# Patient Record
Sex: FEMALE | Race: WHITE | Employment: FULL TIME | URBAN - NONMETROPOLITAN AREA
[De-identification: names, ages, dates, MRNs, and addresses within clinical notes are randomized per-mention and may not be internally consistent; named-entity substitution may affect disease eponyms.]

---

## 2017-08-07 ENCOUNTER — HOSPITAL ENCOUNTER (OUTPATIENT)
Dept: WOMENS IMAGING | Age: 62
Discharge: HOME | End: 2017-08-07
Payer: COMMERCIAL

## 2017-08-07 ENCOUNTER — OFFICE VISIT (OUTPATIENT)
Dept: OBGYN | Age: 62
End: 2017-08-07
Payer: COMMERCIAL

## 2017-08-07 VITALS
SYSTOLIC BLOOD PRESSURE: 138 MMHG | HEART RATE: 74 BPM | DIASTOLIC BLOOD PRESSURE: 80 MMHG | BODY MASS INDEX: 32.18 KG/M2 | TEMPERATURE: 97.8 F | HEIGHT: 67 IN | WEIGHT: 205 LBS

## 2017-08-07 DIAGNOSIS — Z12.4 SCREENING FOR MALIGNANT NEOPLASM OF CERVIX: ICD-10-CM

## 2017-08-07 DIAGNOSIS — Z01.419 ENCOUNTER FOR GYNECOLOGICAL EXAMINATION WITHOUT ABNORMAL FINDING: Primary | ICD-10-CM

## 2017-08-07 DIAGNOSIS — Z12.31 ENCOUNTER FOR SCREENING MAMMOGRAM FOR BREAST CANCER: ICD-10-CM

## 2017-08-07 PROCEDURE — 99396 PREV VISIT EST AGE 40-64: CPT

## 2017-08-07 PROCEDURE — 77063 BREAST TOMOSYNTHESIS BI: CPT

## 2017-08-07 ASSESSMENT — ENCOUNTER SYMPTOMS
SHORTNESS OF BREATH: 0
COUGH: 0
BACK PAIN: 0
TROUBLE SWALLOWING: 0
BLOOD IN STOOL: 0
CONSTIPATION: 0
DIARRHEA: 0
COLOR CHANGE: 0

## 2017-08-09 ENCOUNTER — TELEPHONE (OUTPATIENT)
Dept: WOMENS IMAGING | Age: 62
End: 2017-08-09

## 2017-08-10 ENCOUNTER — HOSPITAL ENCOUNTER (OUTPATIENT)
Dept: WOMENS IMAGING | Age: 62
Discharge: HOME | End: 2017-08-10
Payer: COMMERCIAL

## 2017-08-10 DIAGNOSIS — N63.0 BREAST NODULE: ICD-10-CM

## 2017-08-10 PROCEDURE — 76642 ULTRASOUND BREAST LIMITED: CPT

## 2018-06-08 ENCOUNTER — TRANSCRIBE ORDERS (OUTPATIENT)
Dept: PHYSICAL THERAPY | Facility: HOSPITAL | Age: 63
End: 2018-06-08

## 2018-06-08 DIAGNOSIS — M25.561 RIGHT KNEE PAIN, UNSPECIFIED CHRONICITY: Primary | ICD-10-CM

## 2018-06-11 ENCOUNTER — HOSPITAL ENCOUNTER (OUTPATIENT)
Dept: PHYSICAL THERAPY | Facility: HOSPITAL | Age: 63
Setting detail: THERAPIES SERIES
Discharge: HOME | End: 2018-06-11

## 2018-06-11 DIAGNOSIS — M25.561 RIGHT KNEE PAIN, UNSPECIFIED CHRONICITY: Primary | ICD-10-CM

## 2018-06-11 PROCEDURE — 97161 PT EVAL LOW COMPLEX 20 MIN: CPT | Performed by: PHYSICAL THERAPIST

## 2018-06-11 PROCEDURE — G8979 MOBILITY GOAL STATUS: HCPCS | Performed by: PHYSICAL THERAPIST

## 2018-06-11 PROCEDURE — 97110 THERAPEUTIC EXERCISES: CPT | Performed by: PHYSICAL THERAPIST

## 2018-06-11 PROCEDURE — G8978 MOBILITY CURRENT STATUS: HCPCS | Performed by: PHYSICAL THERAPIST

## 2018-06-11 NOTE — THERAPY EVALUATION
Outpatient Physical Therapy Ortho Initial Evaluation  [facility]     Patient Name: Tabitha Davila  : 1955  MRN: [id number]  Today's Date: 2018      Visit Date: 2018     Subjective Improvement: N/A      Attendance:    Approved:    20 visits/year       MD follow up:   EFFIE         date:   18        There is no problem list on file for this patient.       History reviewed. No pertinent past medical history.     Past Surgical History:   Procedure Laterality Date   •  SECTION     • CHOLECYSTECTOMY     • DILATION AND CURETTAGE, DIAGNOSTIC / THERAPEUTIC     • TONSILLECTOMY       Allergies   Allergen Reactions   • Streptomycin Rash     Caused full body rash with itching as child     Medications:  - Lisinopril  - Aspirin  - Methylprednisolone    Visit Dx:     ICD-10-CM ICD-9-CM   1. Right knee pain, unspecified chronicity M25.561 719.46             Patient History     Row Name 18 1500             History    Chief Complaint Pain  -KG      Type of Pain Knee pain  -KG      Date Current Problem(s) Began 18  -KG      Brief Description of Current Complaint Pt presents with c/o of R knee pain that began on 18. States she felt something popped in the back of her R knee after stepping down a steep step backwards. Had no issues initially after the injury. Sat for a couple of hours that day and it started to swell and become painful with walking. Went to MD at that time and was prescribed Meloxicam; x-rays were negative for fx. Pain started to improve, pt was walking dog in May, reinjured knee again, went to primary care MD and was referred to ortho MD. Was given cortisone/steroid dose pack at [facility] on 18. Pt states most of her pain has been at her medial knee and posterior knee/thigh area, more so medially. Cortisone has helped with pain tremendously. Ambulates with single axillary crutch and has been since May.  -KG      Patient/Caregiver Goals  "Relieve pain;Return to prior level of function;Improve mobility  -KG      Occupation/sports/leisure activities Owns [employer]; does bookeeping and \"everything else\"; lifting, cutting, stooping, bending, etc.  -KG      What clinical tests have you had for this problem? X-ray  -KG      Results of Clinical Tests Negative; no MRI  -KG         Pain     Pain Location Knee  -KG      Pain at Present 0  -KG      Pain at Best 0  -KG      Pain at Worst 1  -KG      Pain Frequency Intermittent  -KG      Pain Description Aching;Sharp  -KG      What Performance Factors Make the Current Problem(s) WORSE? Standing/walking for short periods of time, ascending/descending stairs, squatting  -KG      What Performance Factors Make the Current Problem(s) BETTER? Icing  -KG      Pain Comments Pain much improved since received cortisone/steroid dose pack  -KG      Tolerance Time- Standing Standing for short periods of time  -KG      Tolerance Time- Sitting No issues  -KG      Tolerance Time- Walking Walking for short periods of time  -KG      Tolerance Time- Lying Difficulty finding a comfortable position  -KG      Difficulties at work? Increased pain with work activities.  -KG      Difficulties with ADL's? Independent  -KG      Difficulties with recreational activities? N/A  -KG        User Key  (r) = Recorded By, (t) = Taken By, (c) = Cosigned By    Initials Name Provider Type    KG Zayda Barlow, PT Physical Therapist                PT Ortho     Row Name 06/11/18 1500       Subjective Comments    Subjective Comments Refer to therapy pt history for details.  -KG       Precautions and Contraindications    Precautions/Limitations no known precautions/limitations  -KG       Subjective Pain    Able to rate subjective pain? yes  -KG    Pre-Treatment Pain Level 1  -KG    Post-Treatment Pain Level 0  -KG       Posture/Observations    Posture/Observations Comments No acute distress. Ambulates with single axillary crutch, " non-antalgic gait. Hesitant ambulating without crutch, decreased stance time/heel strike RLE slightly. STR noted at R medial hamstring. Mild edema present medial knee.  -KG       Special Tests/Palpation    Special Tests/Palpation Knee  -KG       Knee Palpation    Pes Anserine Bursa Right:;Tender  -KG    Semimembranosis Right:;Tender;Guarded/taut  -KG    Semitendinosis Right:;Tender;Guarded/taut  -KG    Knee Palpation? Yes  -KG       Patellar Accessory Motions    Patellar Accessory Motions Tested? Yes  -KG    Superior glide WNL  -KG    Inferior glide WNL  -KG    Medial glide WNL  -KG    Lateral glide WNL  -KG       Knee Special Tests    Anterior drawer (ACL lesion) Right:;Negative  -KG    Posterior drawer (PCL lesion) Right:;Negative  -KG    Valgus stress (MCL lesion) Right:;Negative  -KG    Varus stress (LCL lesion) Right:;Negative  -KG    Thessaly test (meniscal lesion) Right:;Negative  -KG    Sasha’s test (meniscal lesion) Right:;Negative   Popping present with ER/iR  -KG    Bounce home test (meniscal lesion) Right:;Negative  -KG       General ROM    RT Lower Ext Rt Knee Extension/Flexion  -KG    LT Lower Ext Lt Knee Extension/Flexion  -KG       Right Lower Ext    Rt Knee Extension/Flexion AROM 0-125  -KG    RT Lower Extremity Comments No pain  -KG       Left Lower Ext    Lt Knee Extension/Flexion AROM 0-126  -KG    LT Lower Extremity Comments No pain  -KG       MMT (Manual Muscle Testing)    Additional Documentation General Assessment (Manual Muscle Testing) (Group)  -KG       General Assessment (Manual Muscle Testing)    General Manual Muscle Testing (MMT) Assessment lower extremity strength deficits identified  -KG       Lower Extremity (Manual Muscle Testing)    Lower Extremity: Manual Muscle Testing (MMT) left hip strength deficit;right hip strength deficit;left knee strength deficit;right knee strength deficit  -KG       Left Hip (Manual Muscle Testing)    MMT, Left Hip: Manual Muscle Testing (MMT)  "Flex/abd grossly 5/5  -KG       Right Hip (Manual Muscle Testing)    MMT, Right Hip: Manual Muscle Testing (MMT) Flex 4+/5, abd 4/5  -KG       Left Knee (Manual Muscle Testing)    Comment, Left Knee: Manual Muscle Testing (MMT) Grossly 5/5  -KG       Right Knee (Manual Muscle Testing)    Comment, Right Knee: Manual Muscle Testing (MMT) Flex 4/5, ext 4/5 with pain lat hamstring; no quad lag noted with independent active SLR  -KG       Sensation    Sensation WNL? WNL  -KG    Light Touch No apparent deficits  -KG       Flexibility    Flexibility Tested? Lower Extremity  -KG       Lower Extremity Flexibility    Hamstrings Right:;Mildly limited  -KG    Quadriceps Right:;Mildly limited  -KG       Balance Skills Training    SLS L SLS 30\", R SLS 3\"  -KG      User Key  (r) = Recorded By, (t) = Taken By, (c) = Cosigned By    Initials Name Provider Type    KG Zayda Barlow, PT Physical Therapist                      Therapy Education  Education Details: POC education, anatomy ed, HEP: iso HS set, HS stretch, QS, SAQ, SLR flex  Given: HEP, Symptoms/condition management, Pain management, Posture/body mechanics, Mobility training, Edema management  Program: New  How Provided: Verbal, Demonstration, Written  Provided to: Patient  Level of Understanding: Teach back education performed, Verbalized, Demonstrated           PT OP Goals     Row Name 06/11/18 1500          PT Short Term Goals    STG Date to Achieve 07/02/18  -KG     STG 1 Independent/compliant with HEP  -KG     STG 1 Progress New  -KG     STG 2 Tolerate 45 minute treatment session without increased pain  -KG     STG 2 Progress New  -KG     STG 3 Ambulate independently with no AD, non-antalgic gait, good heel strike noted  -KG     STG 3 Progress New  -KG     STG 4 Maintain knee AROM WNL without increased pain  -KG     STG 4 Progress New  -KG     STG 5 Perform R SLS on level surface for 30\" or greater  -KG     STG 5 Progress New  -KG        Long Term Goals    LTG Date " to Achieve 07/16/18  -KG     LTG 1 Subjectively report 60% improvement or greater  -KG     LTG 1 Progress New  -KG     LTG 2 Improve LEFS score to 40/80 or greater  -KG     LTG 2 Progress New  -KG     LTG 3 Improve R hip flex/abd MMT to 4+/5 or greater  -KG     LTG 3 Progress New  -KG     LTG 4 Demonstrate R knee flex/ext MMT to 5/5  -KG     LTG 4 Progress New  -KG     LTG 5 Ascend/descend 3 steps x 2, reciprocal pattern, no increased pain/compensation  -KG     LTG 5 Progress New  -KG     LTG 6 Independent in aquatics/fitness & final HEP  -KG     LTG 6 Progress New  -KG     LTG 7 Subjectively report improved TTP at medial hamstring by 75%  -KG     LTG 7 Progress New  -KG        Time Calculation    PT Goal Re-Cert Due Date 07/02/18  -KG       User Key  (r) = Recorded By, (t) = Taken By, (c) = Cosigned By    Initials Name Provider Type    KG Zayda Barlow, PT Physical Therapist                PT Assessment/Plan     Row Name 06/11/18 1500          PT Assessment    Functional Limitations Decreased safety during functional activities;Impaired gait;Limitation in home management;Limitations in community activities;Limitations in functional capacity and performance;Performance in leisure activities;Performance in work activities  -KG     Impairments Balance;Gait;Edema;Impaired flexibility;Impaired muscle endurance;Muscle strength;Pain;Range of motion  -KG     Assessment Comments Pt is a 62 y.o. female presenting with R knee pain. Ligament stability testing negative at this time. Meniscus testing negative as well and non-painful, slight popping noted with Sasha's. Pt very TTP at R medial hamstring with STR present. Improved symptoms reported post manual this date. R knee AROM WFL and non-painful at this time. Pt demonstrates weakness in overall R knee/hip at this time. Received cortisone/steroid dose pack on 6/6/18 which has helped quite a bit with her pain. Pt will benefit from skilled PT services to address these  deficits for improvements in overall knee/hip functional strength, hamstring flexibility/STR, and tolerance to daily activities.  -KG     Please refer to paper survey for additional self-reported information Yes  -KG     Rehab Potential Good  -KG     Patient/caregiver participated in establishment of treatment plan and goals Yes  -KG     Patient would benefit from skilled therapy intervention Yes  -KG        PT Plan    PT Frequency 2x/week  -KG     Predicted Duration of Therapy Intervention (OT Eval) 4-6 weeks  -KG     Planned CPT's? PT EVAL LOW COMPLEXITY: 33068;PT RE-EVAL: 36187;PT THER PROC EA 15 MIN: 13307;PT THER ACT EA 15 MIN: 52123;PT MANUAL THERAPY EA 15 MIN: 88540;PT NEUROMUSC RE-EDUCATION EA 15 MIN: 24971;PT GAIT TRAINING EA 15 MIN: 19761;PT SELF CARE/HOME MGMT/TRAIN EA 15: 92529;PT ELECTRICAL STIM UNATTEND: ;PT AQUATIC THERAPY EA 15 MIN: 04790;PT THER SUPP EA 15 MIN  -KG     Physical Therapy Interventions (Optional Details) aquatics exercise;balance training;home exercise program;manual therapy techniques;modalities;neuromuscular re-education;patient/family education;ROM (Range of Motion);strengthening;stretching  -KG     PT Plan Comments Begin 1x/pool & 1x/land treatment per week and progress to all land therapy. Work on knee/hip strengthening, LE stretching, STM/MFR to medial hamstring, progress CKC activities as tolerated, modalities prn for pain.  -KG       User Key  (r) = Recorded By, (t) = Taken By, (c) = Cosigned By    Initials Name Provider Type    IVON Barlow, PT Physical Therapist                Modalities     Row Name 06/11/18 1500             Ice    Patient denies application of Ice Yes  -KG        User Key  (r) = Recorded By, (t) = Taken By, (c) = Cosigned By    Initials Name Provider Type    IVON Barlow, PT Physical Therapist              Exercises     Row Name 06/11/18 1500             Subjective Comments    Subjective Comments Refer to therapy pt history for details.   "-KG         Subjective Pain    Able to rate subjective pain? yes  -KG      Pre-Treatment Pain Level 1  -KG      Post-Treatment Pain Level 0  -KG         Aquatics    Aquatics performed? No  -KG         Exercise 1    Exercise Name 1 Quad sets  -KG      Sets 1 1  -KG      Reps 1 10  -KG      Time 1 5\" hold  -KG         Exercise 2    Exercise Name 2 SAQ  -KG      Sets 2 1  -KG      Reps 2 10  -KG         Exercise 3    Exercise Name 3 SLR flex  -KG      Sets 3 1  -KG      Reps 3 10  -KG         Exercise 4    Exercise Name 4 Seated hamstring stretch  -KG      Reps 4 2  -KG      Time 4 30\" hold  -KG      Additional Comments Reviewed seated & Long sitting  -KG         Exercise 5    Exercise Name 5 Iso hamstring sets  -KG      Additional Comments Review/demo for HEP  -KG        User Key  (r) = Recorded By, (t) = Taken By, (c) = Cosigned By    Initials Name Provider Type    IVON Barlow PT Physical Therapist           Manual Rx (last 36 hours)      Manual Treatments     Row Name 06/11/18 1500             Manual Rx 1    Manual Rx 1 Location R medial hamstring  -KG      Manual Rx 1 Type STM/MFR  -KG      Manual Rx 1 Duration 5 min  -KG        User Key  (r) = Recorded By, (t) = Taken By, (c) = Cosigned By    Initials Name Provider Type    IVON Barlow, PT Physical Therapist                      Outcome Measure Options: Lower Extremity Functional Scale (LEFS)  Lower Extremity Functional Index  Any of your usual work, housework or school activities: Extreme difficulty or unable to perform activity  Your usual hobbies, recreational or sporting activities: Extreme difficulty or unable to perform activity  Getting into or out of the bath: Quite a bit of difficulty  Walking between rooms: Quite a bit of difficulty  Putting on your shoes or socks: No difficulty  Squatting: No difficulty  Lifting an object, like a bag of groceries from the floor: No difficulty  Performing light activities around your home: Moderate " difficulty  Performing heavy activities around your home: Moderate difficulty  Getting into or out of a car: Moderate difficulty  Walking 2 blocks: Extreme difficulty or unable to perform activity  Walking a mile: Extreme difficulty or unable to perform activity  Going up or down 10 stairs (about 1 flight of stairs): Extreme difficulty or unable to perform activity  Standing for 1 hour: Extreme difficulty or unable to perform activity  Sitting for 1 hour: No difficulty  Running on even ground: Extreme difficulty or unable to perform activity  Running on uneven ground: Extreme difficulty or unable to perform activity  Making sharp turns while running fast: Extreme difficulty or unable to perform activity  Hopping: Extreme difficulty or unable to perform activity  Rolling over in bed: A little bit of difficulty  Total: 27      Time Calculation:   Start Time: 1522  Stop Time: 1608  Time Calculation (min): 46 min  Total Timed Code Minutes- PT: 20 minute(s)     Therapy Charges for Today     Code Description Service Date Service Provider Modifiers Qty    55549092499 HC PT MOBILITY CURRENT 6/11/2018 Zayda Barlow, PT GP, CK 1    16841154910 HC PT MOBILITY PROJECTED 6/11/2018 Zayda Barlow, PT GP, CI 1    29322081556 HC PT THER PROC EA 15 MIN 6/11/2018 Zayda Barlow, PT GP 1    24638115724 HC PT EVAL LOW COMPLEXITY 2 6/11/2018 Zayda Barlow, PT GP 1          PT G-Codes  PT Professional Judgement Used?: Yes  Outcome Measure Options: Lower Extremity Functional Scale (LEFS)  Score: 27/80  Functional Limitation: Mobility: Walking and moving around  Mobility: Walking and Moving Around Current Status (): At least 40 percent but less than 60 percent impaired, limited or restricted  Mobility: Walking and Moving Around Goal Status (): At least 1 percent but less than 20 percent impaired, limited or restricted         Zayda Barlow, PT  6/11/2018

## 2018-06-13 ENCOUNTER — HOSPITAL ENCOUNTER (OUTPATIENT)
Dept: PHYSICAL THERAPY | Facility: HOSPITAL | Age: 63
Setting detail: THERAPIES SERIES
Discharge: HOME | End: 2018-06-13

## 2018-06-13 DIAGNOSIS — M25.561 RIGHT KNEE PAIN, UNSPECIFIED CHRONICITY: Primary | ICD-10-CM

## 2018-06-13 PROCEDURE — 97110 THERAPEUTIC EXERCISES: CPT

## 2018-06-13 NOTE — THERAPY TREATMENT NOTE
Outpatient Physical Therapy Ortho Treatment Note  [facility]     Patient Name: Tabitha Davila  : 1955  MRN: [id number]  Today's Date: 2018      Subjective Improvement: N/A      Attendance:   Approved:    20 visits/year       MD follow up:   EFFIE MCFARLAND date:   18      Visit Date: 2018    Visit Dx:    ICD-10-CM ICD-9-CM   1. Right knee pain, unspecified chronicity M25.561 719.46       There is no problem list on file for this patient.       No past medical history on file.     Past Surgical History:   Procedure Laterality Date   •  SECTION     • CHOLECYSTECTOMY     • DILATION AND CURETTAGE, DIAGNOSTIC / THERAPEUTIC     • TONSILLECTOMY               PT Ortho     Row Name 18 1600       Subjective Pain    Able to rate subjective pain? yes  -NH    Pre-Treatment Pain Level 2  -NH    Row Name 18 1500       Subjective Comments    Subjective Comments Refer to therapy pt history for details.  -KG       Precautions and Contraindications    Precautions/Limitations no known precautions/limitations  -KG       Subjective Pain    Able to rate subjective pain? yes  -KG    Pre-Treatment Pain Level 1  -KG    Post-Treatment Pain Level 0  -KG       Posture/Observations    Posture/Observations Comments No acute distress. Ambulates with single axillary crutch, non-antalgic gait. Hesitant ambulating without crutch, decreased stance time/heel strike RLE slightly. STR noted at R medial hamstring. Mild edema present medial knee.  -KG       Special Tests/Palpation    Special Tests/Palpation Knee  -KG       Knee Palpation    Pes Anserine Bursa Right:;Tender  -KG    Semimembranosis Right:;Tender;Guarded/taut  -KG    Semitendinosis Right:;Tender;Guarded/taut  -KG    Knee Palpation? Yes  -KG       Patellar Accessory Motions    Patellar Accessory Motions Tested? Yes  -KG    Superior glide WNL  -KG    Inferior glide WNL  -KG    Medial glide WNL  -KG    Lateral glide WNL  -KG        Knee Special Tests    Anterior drawer (ACL lesion) Right:;Negative  -KG    Posterior drawer (PCL lesion) Right:;Negative  -KG    Valgus stress (MCL lesion) Right:;Negative  -KG    Varus stress (LCL lesion) Right:;Negative  -KG    Thessaly test (meniscal lesion) Right:;Negative  -KG    Sasha’s test (meniscal lesion) Right:;Negative   Popping present with ER/iR  -KG    Bounce home test (meniscal lesion) Right:;Negative  -KG       General ROM    RT Lower Ext Rt Knee Extension/Flexion  -KG    LT Lower Ext Lt Knee Extension/Flexion  -KG       Right Lower Ext    Rt Knee Extension/Flexion AROM 0-125  -KG    RT Lower Extremity Comments No pain  -KG       Left Lower Ext    Lt Knee Extension/Flexion AROM 0-126  -KG    LT Lower Extremity Comments No pain  -KG       MMT (Manual Muscle Testing)    Additional Documentation General Assessment (Manual Muscle Testing) (Group)  -KG       General Assessment (Manual Muscle Testing)    General Manual Muscle Testing (MMT) Assessment lower extremity strength deficits identified  -KG       Lower Extremity (Manual Muscle Testing)    Lower Extremity: Manual Muscle Testing (MMT) left hip strength deficit;right hip strength deficit;left knee strength deficit;right knee strength deficit  -KG       Left Hip (Manual Muscle Testing)    MMT, Left Hip: Manual Muscle Testing (MMT) Flex/abd grossly 5/5  -KG       Right Hip (Manual Muscle Testing)    MMT, Right Hip: Manual Muscle Testing (MMT) Flex 4+/5, abd 4/5  -KG       Left Knee (Manual Muscle Testing)    Comment, Left Knee: Manual Muscle Testing (MMT) Grossly 5/5  -KG       Right Knee (Manual Muscle Testing)    Comment, Right Knee: Manual Muscle Testing (MMT) Flex 4/5, ext 4/5 with pain lat hamstring; no quad lag noted with independent active SLR  -KG       Sensation    Sensation WNL? WNL  -KG    Light Touch No apparent deficits  -KG       Flexibility    Flexibility Tested? Lower Extremity  -KG       Lower Extremity Flexibility    Hamstrings  "Right:;Mildly limited  -KG    Quadriceps Right:;Mildly limited  -KG       Balance Skills Training    SLS L SLS 30\", R SLS 3\"  -KG      User Key  (r) = Recorded By, (t) = Taken By, (c) = Cosigned By    Initials Name Provider Type     Zayda Barlow, PT Physical Therapist    NH Harriet Renteria, PT Physical Therapist                            PT Assessment/Plan     Row Name 06/13/18 1600          PT Assessment    Functional Limitations Decreased safety during functional activities;Impaired gait;Limitation in home management;Limitations in community activities;Limitations in functional capacity and performance;Performance in leisure activities;Performance in work activities  -NH     Impairments Balance;Gait;Edema;Impaired flexibility;Impaired muscle endurance;Muscle strength;Pain;Range of motion  -NH     Assessment Comments Improved STR at medial HS insertion. Patient able to advance exercises with minimal difficulty. Patient requires cues with amb with SPC to improve heel strike. Patient amb with R foot externally rotated but able to fix given periodic cuing.   -NH     Rehab Potential Good  -NH     Patient/caregiver participated in establishment of treatment plan and goals Yes  -NH     Patient would benefit from skilled therapy intervention Yes  -NH        PT Plan    PT Frequency 2x/week  -NH     Predicted Duration of Therapy Intervention (Therapy Eval) 4-6 weeks  -NH     PT Plan Comments 1x pool/1x land next week. F/U results of advancements in ther ex and KT tape.   -NH       User Key  (r) = Recorded By, (t) = Taken By, (c) = Cosigned By    Initials Name Provider Type    NH Harriet Renteria, PT Physical Therapist                Modalities     Row Name 06/13/18 1600             Ice    Patient denies application of Ice Yes  -NH        User Key  (r) = Recorded By, (t) = Taken By, (c) = Cosigned By    Initials Name Provider Type    NH Harriet Renteria, PT Physical Therapist                Exercises     Row Name " 06/13/18 1600             Subjective Pain    Able to rate subjective pain? yes  -NH      Pre-Treatment Pain Level 2  -NH         Exercise 1    Exercise Name 1 Pro II for ROM/endurance  -NH      Time 1 8 min  -NH      Additional Comments L2  -NH         Exercise 2    Exercise Name 2 SAQ  -NH      Sets 2 2  -NH      Reps 2 10  -NH         Exercise 3    Exercise Name 3 SLR flex  -NH      Sets 3 2  -NH      Reps 3 10  -NH         Exercise 4    Exercise Name 4 Quad set  -NH      Sets 4 2  -NH      Reps 4 10  -NH         Exercise 5    Exercise Name 5 LAQ with hip add  -NH      Sets 5 2  -NH      Reps 5 10  -NH         Exercise 6    Exercise Name 6 HS curls seated  -NH      Sets 6 2  -NH      Reps 6 10  -NH      Additional Comments Red tband  -NH         Exercise 7    Exercise Name 7 TKE  -NH      Sets 7 2  -NH      Reps 7 10  -NH      Additional Comments red tband  -NH         Exercise 8    Exercise Name 8 Seated HS stretch  -NH      Sets 8 3  -NH      Time 8 30 sec  -NH         Exercise 9    Exercise Name 9 Slantboard Stretch  -NH      Sets 9 3  -NH      Time 9 30 sec  -NH        User Key  (r) = Recorded By, (t) = Taken By, (c) = Cosigned By    Initials Name Provider Type    NH Harriet Renteria, PT Physical Therapist                        Manual Rx (last 36 hours)      Manual Treatments     Row Name 06/13/18 1500             Manual Rx 1    Manual Rx 1 Location R Knee   -NH      Manual Rx 1 Type KT tape with patellar band and 2 piece cross  -NH      Manual Rx 1 Duration 3 min  -NH        User Key  (r) = Recorded By, (t) = Taken By, (c) = Cosigned By    Initials Name Provider Type    NH Harriet Renteria, PT Physical Therapist                PT OP Goals     Row Name 06/13/18 1600          PT Short Term Goals    STG Date to Achieve 07/02/18  -NH     STG 1 Independent/compliant with HEP  -NH     STG 1 Progress New  -NH     STG 2 Tolerate 45 minute treatment session without increased pain  -NH     STG 2 Progress New  -NH      "STG 3 Ambulate independently with no AD, non-antalgic gait, good heel strike noted  -NH     STG 3 Progress New  -NH     STG 4 Maintain knee AROM WNL without increased pain  -NH     STG 4 Progress New  -NH     STG 5 Perform R SLS on level surface for 30\" or greater  -NH     STG 5 Progress New  -NH        Long Term Goals    LTG Date to Achieve 07/16/18  -NH     LTG 1 Subjectively report 60% improvement or greater  -NH     LTG 1 Progress New  -NH     LTG 2 Improve LEFS score to 40/80 or greater  -NH     LTG 2 Progress New  -NH     LTG 3 Improve R hip flex/abd MMT to 4+/5 or greater  -NH     LTG 3 Progress New  -NH     LTG 4 Demonstrate R knee flex/ext MMT to 5/5  -NH     LTG 4 Progress New  -NH     LTG 5 Ascend/descend 3 steps x 2, reciprocal pattern, no increased pain/compensation  -NH     LTG 5 Progress New  -NH     LTG 6 Independent in aquatics/fitness & final HEP  -NH     LTG 6 Progress New  -NH     LTG 7 Subjectively report improved TTP at medial hamstring by 75%  -NH     LTG 7 Progress New  -NH        Time Calculation    PT Goal Re-Cert Due Date 07/02/18  -NH       User Key  (r) = Recorded By, (t) = Taken By, (c) = Cosigned By    Initials Name Provider Type    NH Harriet Renteria, PT Physical Therapist                         Time Calculation:   Start Time: 1605  Stop Time: 1651  Time Calculation (min): 46 min  Total Timed Code Minutes- PT: 46 minute(s)  Therapy Suggested Charges     Code   Minutes Charges    None           Therapy Charges for Today     Code Description Service Date Service Provider Modifiers Qty    37079888552  PT THER PROC EA 15 MIN 6/13/2018 Harriet Renteria, PT GP 3                    Harriet Renteria, PT  6/13/2018     "

## 2018-06-19 ENCOUNTER — HOSPITAL ENCOUNTER (OUTPATIENT)
Dept: PHYSICAL THERAPY | Facility: HOSPITAL | Age: 63
Setting detail: THERAPIES SERIES
Discharge: HOME | End: 2018-06-19

## 2018-06-19 DIAGNOSIS — M25.561 RIGHT KNEE PAIN, UNSPECIFIED CHRONICITY: Primary | ICD-10-CM

## 2018-06-19 PROCEDURE — 97113 AQUATIC THERAPY/EXERCISES: CPT | Performed by: PHYSICAL THERAPIST

## 2018-06-19 NOTE — THERAPY TREATMENT NOTE
Outpatient Physical Therapy Ortho Treatment Note  [facility]     Patient Name: Tabitha Davila  : 1955  MRN: [id number]  Today's Date: 2018      Visit Date: 2018     Subjective Improvement: 70%       Attendance: 3/3  Approved:   20 visits/year        MD follow up:   EFFIE MCFARLAND date:   18        Visit Dx:    ICD-10-CM ICD-9-CM   1. Right knee pain, unspecified chronicity M25.561 719.46       There is no problem list on file for this patient.       No past medical history on file.     Past Surgical History:   Procedure Laterality Date   •  SECTION     • CHOLECYSTECTOMY     • DILATION AND CURETTAGE, DIAGNOSTIC / THERAPEUTIC     • TONSILLECTOMY               PT Ortho     Row Name 18 1500       Precautions and Contraindications    Precautions/Limitations no known precautions/limitations  -KG       Subjective Pain    Able to rate subjective pain? yes  -KG    Pre-Treatment Pain Level 1  -KG    Post-Treatment Pain Level 0  -KG       Posture/Observations    Posture/Observations Comments No acute distress. Ambulates with SPC, non-antalgic gait. Ambulated with no AD after getting out of pool, non-antalgic gait.  -KG      User Key  (r) = Recorded By, (t) = Taken By, (c) = Cosigned By    Initials Name Provider Type    KG Zayda Barlow, PT Physical Therapist                            PT Assessment/Plan     Row Name 18 1500          PT Assessment    Functional Limitations Decreased safety during functional activities;Impaired gait;Limitation in home management;Limitations in community activities;Limitations in functional capacity and performance;Performance in leisure activities;Performance in work activities  -KG     Impairments Balance;Gait;Edema;Impaired flexibility;Impaired muscle endurance;Muscle strength;Pain;Range of motion  -KG     Assessment Comments Pt with good initial tolerance to aquatic treatment. Ocassional cuing need for proper mini squat/lunge  "performance. No increased pain reported throughout treatment. Increased subjective improvement reported this date. Pt reports she may want to continue with all land treatments as her pain has improved.  -KG     Rehab Potential Good  -KG     Patient/caregiver participated in establishment of treatment plan and goals Yes  -KG     Patient would benefit from skilled therapy intervention Yes  -KG        PT Plan    PT Frequency 2x/week  -KG     Predicted Duration of Therapy Intervention (Therapy Eval) 4-6 weeks  -KG     PT Plan Comments Will continue all land treatments at this time. Continue to progress CKC/strengthening activities as tolerated.  -KG       User Key  (r) = Recorded By, (t) = Taken By, (c) = Cosigned By    Initials Name Provider Type    KG Zayda Barlow, PT Physical Therapist                    Exercises     Row Name 06/19/18 1500             Subjective Comments    Subjective Comments Pt states her knee is feeling much better. Only has twinge every now & then.  -KG         Subjective Pain    Able to rate subjective pain? yes  -KG      Pre-Treatment Pain Level 1  -KG      Post-Treatment Pain Level 0  -KG         Aquatics    Aquatics performed? Yes  -KG         Aquatics LE    Water Walk forward;side;backward;Other (comment)   F/B 4', lateral 3', march walk 3'  -KG      Stretch 1 Hamstring stretch 3x30\"  -KG      Hip Abd/Add 2x10; bilateral  -KG      Hip Flex/Ext 2x10; bilateral  -KG      Mini Squat 2x10  -KG      Toe/Heel Raises 2x10  -KG      Bicycle 3 min deep water  -KG      Flutter/Scissor 2 min deep water each direction  -KG         Exercise 1    Exercise Name 1 AQ: Float step march with BRF  -KG      Sets 1 2  -KG      Reps 1 10  -KG         Exercise 2    Exercise Name 2 AQ: Hamstring curl  -KG      Sets 2 2  -KG      Reps 2 10  -KG         Exercise 3    Exercise Name 3 AQ: Static lunges  -KG      Cueing 3 Verbal  -KG      Sets 3 2  -KG      Reps 3 10  -KG      Additional Comments Cues for form  -KG " "       User Key  (r) = Recorded By, (t) = Taken By, (c) = Cosigned By    Initials Name Provider Type    KG Zayda Barlow, PT Physical Therapist                               PT OP Goals     Row Name 06/19/18 1500          PT Short Term Goals    STG Date to Achieve 07/02/18  -KG     STG 1 Independent/compliant with HEP  -KG     STG 1 Progress Progressing  -KG     STG 2 Tolerate 45 minute treatment session without increased pain  -KG     STG 2 Progress Progressing  -KG     STG 3 Ambulate independently with no AD, non-antalgic gait, good heel strike noted  -KG     STG 3 Progress Progressing  -KG     STG 4 Maintain knee AROM WNL without increased pain  -KG     STG 4 Progress Progressing  -KG     STG 5 Perform R SLS on level surface for 30\" or greater  -KG     STG 5 Progress Progressing  -KG        Long Term Goals    LTG Date to Achieve 07/16/18  -KG     LTG 1 Subjectively report 60% improvement or greater  -KG     LTG 1 Progress Progressing  -KG     LTG 2 Improve LEFS score to 40/80 or greater  -KG     LTG 2 Progress Progressing  -KG     LTG 3 Improve R hip flex/abd MMT to 4+/5 or greater  -KG     LTG 3 Progress Progressing  -KG     LTG 4 Demonstrate R knee flex/ext MMT to 5/5  -KG     LTG 4 Progress Progressing  -KG     LTG 5 Ascend/descend 3 steps x 2, reciprocal pattern, no increased pain/compensation  -KG     LTG 5 Progress Progressing  -KG     LTG 6 Independent in aquatics/fitness & final HEP  -KG     LTG 6 Progress Progressing  -KG     LTG 7 Subjectively report improved TTP at medial hamstring by 75%  -KG     LTG 7 Progress Progressing  -KG        Time Calculation    PT Goal Re-Cert Due Date 07/02/18  -KG       User Key  (r) = Recorded By, (t) = Taken By, (c) = Cosigned By    Initials Name Provider Type    IVON Barlow, PT Physical Therapist          Therapy Education  Given: HEP, Symptoms/condition management, Posture/body mechanics  Program: Reinforced  How Provided: Verbal  Provided to: Patient  Level " of Understanding: Verbalized, Demonstrated              Time Calculation:   Start Time: 1522  Stop Time: 1605  Time Calculation (min): 43 min  Total Timed Code Minutes- PT: 43 minute(s)  Therapy Suggested Charges     Code   Minutes Charges    None           Therapy Charges for Today     Code Description Service Date Service Provider Modifiers Qty    [id number] HC PT AQUATIC THERAPY EA 15 MIN 6/19/2018 Zayda Barlow, PT GP 3                    Zayda Barlow, PT  6/19/2018

## 2018-06-21 ENCOUNTER — HOSPITAL ENCOUNTER (OUTPATIENT)
Dept: PHYSICAL THERAPY | Facility: HOSPITAL | Age: 63
Setting detail: THERAPIES SERIES
Discharge: HOME | End: 2018-06-21

## 2018-06-21 DIAGNOSIS — M25.561 RIGHT KNEE PAIN, UNSPECIFIED CHRONICITY: Primary | ICD-10-CM

## 2018-06-21 PROCEDURE — 97110 THERAPEUTIC EXERCISES: CPT | Performed by: PHYSICAL THERAPIST

## 2018-06-21 NOTE — THERAPY TREATMENT NOTE
Outpatient Physical Therapy Ortho Treatment Note  [facility]     Patient Name: Tabitha Davila  : 1955  MRN: [id number]  Today's Date: 2018      Visit Date: 2018     Subjective Improvement:   70%    Attendance:   Approved:           MD follow up:    EFFIE MCFARLAND date:    18       Visit Dx:    ICD-10-CM ICD-9-CM   1. Right knee pain, unspecified chronicity M25.561 719.46       There is no problem list on file for this patient.       No past medical history on file.     Past Surgical History:   Procedure Laterality Date   •  SECTION     • CHOLECYSTECTOMY     • DILATION AND CURETTAGE, DIAGNOSTIC / THERAPEUTIC     • TONSILLECTOMY               PT Ortho     Row Name 18 1500       Precautions and Contraindications    Precautions/Limitations no known precautions/limitations  -KG       Posture/Observations    Posture/Observations Comments No acute distress. Ambulates with no AD, non-antalgic this date.  -KG    Row Name 18 1500       Precautions and Contraindications    Precautions/Limitations no known precautions/limitations  -KG       Subjective Pain    Able to rate subjective pain? yes  -KG    Pre-Treatment Pain Level 1  -KG    Post-Treatment Pain Level 0  -KG       Posture/Observations    Posture/Observations Comments No acute distress. Ambulates with SPC, non-antalgic gait. Ambulated with no AD after getting out of pool, non-antalgic gait.  -KG      User Key  (r) = Recorded By, (t) = Taken By, (c) = Cosigned By    Initials Name Provider Type    IVON Barlow PT Physical Therapist                            PT Assessment/Plan     Row Name 18 1500          PT Assessment    Functional Limitations Decreased safety during functional activities;Impaired gait;Limitation in home management;Limitations in community activities;Limitations in functional capacity and performance;Performance in leisure activities;Performance in work activities   -KG     Impairments Balance;Gait;Edema;Impaired flexibility;Impaired muscle endurance;Muscle strength;Pain;Range of motion  -KG     Assessment Comments Pt tolerated treatment well this date without increased pain. Reports fatigue/soreness with SL abd. Demonstrates improved STR at medial hamstring insertion this date.   -KG     Rehab Potential Good  -KG     Patient/caregiver participated in establishment of treatment plan and goals Yes  -KG     Patient would benefit from skilled therapy intervention Yes  -KG        PT Plan    PT Frequency 2x/week  -KG     Predicted Duration of Therapy Intervention (Therapy Eval) 4-6 weeks  -KG     PT Plan Comments Aquatic treatment next visit.   -KG       User Key  (r) = Recorded By, (t) = Taken By, (c) = Cosigned By    Initials Name Provider Type    KG Zayda Barlow, PT Physical Therapist                Modalities     Row Name 06/21/18 1500             Subjective Comments    Subjective Comments Pt states she was a little sore in her knee and at the back of her knee after the last pool treatment. Feels better today.  -KG         Subjective Pain    Able to rate subjective pain? yes  -KG      Pre-Treatment Pain Level 1  -KG         Ice    Patient denies application of Ice Yes  -KG        User Key  (r) = Recorded By, (t) = Taken By, (c) = Cosigned By    Initials Name Provider Type    IVON Barlow, PT Physical Therapist                Exercises     Row Name 06/21/18 1500             Subjective Comments    Subjective Comments Pt states she was a little sore in her knee and at the back of her knee after the last pool treatment. Feels better today.  -KG         Subjective Pain    Able to rate subjective pain? yes  -KG      Pre-Treatment Pain Level 1  -KG      Post-Treatment Pain Level 1  -KG         Aquatics    Aquatics performed? No  -KG         Exercise 1    Exercise Name 1 Pro II for ROM/endurance  -KG      Time 1 10 min  -KG      Additional Comments L 3.0  -KG         Exercise  "2    Exercise Name 2 Incline stretch  -KG      Reps 2 3  -KG      Time 2 30\" hold  -KG         Exercise 3    Exercise Name 3 Standing HS stretch  -KG      Reps 3 2  -KG      Time 3 30\" hold  -KG         Exercise 4    Exercise Name 4 Standing CR/TR  -KG      Sets 4 1  -KG      Reps 4 20  -KG         Exercise 5    Exercise Name 5 Mini squats  -KG      Cueing 5 Verbal  -KG      Sets 5 2  -KG      Reps 5 10  -KG         Exercise 6    Exercise Name 6 TKE  -KG      Cueing 6 Verbal  -KG      Sets 6 2  -KG      Reps 6 10  -KG      Additional Comments Red tband  -KG         Exercise 7    Exercise Name 7 LAQ with hip add  -KG      Sets 7 2  -KG      Reps 7 10  -KG         Exercise 8    Exercise Name 8 HS curls seated  -KG      Sets 8 2  -KG      Reps 8 10  -KG         Exercise 9    Exercise Name 9 SLR flex  -KG      Sets 9 2  -KG      Reps 9 10  -KG         Exercise 10    Exercise Name 10 SAQ  -KG      Sets 10 2  -KG      Reps 10 10  -KG      Time 10 3\" hold + 3\" pause longterm down  -KG         Exercise 11    Exercise Name 11 SL hip abd  -KG      Sets 11 2  -KG      Reps 11 10  -KG        User Key  (r) = Recorded By, (t) = Taken By, (c) = Cosigned By    Initials Name Provider Type    KG Zayda Barlow, PT Physical Therapist                               PT OP Goals     Row Name 06/21/18 1500          PT Short Term Goals    STG Date to Achieve 07/02/18  -KG     STG 1 Independent/compliant with HEP  -KG     STG 1 Progress Met;Ongoing  -KG     STG 2 Tolerate 45 minute treatment session without increased pain  -KG     STG 2 Progress Met  -KG     STG 3 Ambulate independently with no AD, non-antalgic gait, good heel strike noted  -KG     STG 3 Progress Progressing  -KG     STG 4 Maintain knee AROM WNL without increased pain  -KG     STG 4 Progress Partially Met  -KG     STG 5 Perform R SLS on level surface for 30\" or greater  -KG     STG 5 Progress Progressing  -KG        Long Term Goals    LTG Date to Achieve 07/16/18  -KG     " LTG 1 Subjectively report 60% improvement or greater  -KG     LTG 1 Progress Progressing  -KG     LTG 2 Improve LEFS score to 40/80 or greater  -KG     LTG 2 Progress Progressing  -KG     LTG 3 Improve R hip flex/abd MMT to 4+/5 or greater  -KG     LTG 3 Progress Progressing  -KG     LTG 4 Demonstrate R knee flex/ext MMT to 5/5  -KG     LTG 4 Progress Progressing  -KG     LTG 5 Ascend/descend 3 steps x 2, reciprocal pattern, no increased pain/compensation  -KG     LTG 5 Progress Progressing  -KG     LTG 6 Independent in aquatics/fitness & final HEP  -KG     LTG 6 Progress Progressing  -KG     LTG 7 Subjectively report improved TTP at medial hamstring by 75%  -KG     LTG 7 Progress Progressing  -KG        Time Calculation    PT Goal Re-Cert Due Date 07/02/18  -KG       User Key  (r) = Recorded By, (t) = Taken By, (c) = Cosigned By    Initials Name Provider Type    KG Zayda Barlow, PT Physical Therapist          Therapy Education  Given: HEP, Symptoms/condition management, Posture/body mechanics  Program: Reinforced  How Provided: Verbal  Provided to: Patient  Level of Understanding: Verbalized, Demonstrated              Time Calculation:   Start Time: 1517  Stop Time: 1602  Time Calculation (min): 45 min  Total Timed Code Minutes- PT: 45 minute(s)  Therapy Suggested Charges     Code   Minutes Charges    None           Therapy Charges for Today     Code Description Service Date Service Provider Modifiers Qty    00242209485 HC PT THER PROC EA 15 MIN 6/21/2018 Zayda Barlow, PT GP 3                    Zayda Barlow, PT  6/21/2018

## 2018-06-26 ENCOUNTER — HOSPITAL ENCOUNTER (OUTPATIENT)
Dept: PHYSICAL THERAPY | Facility: HOSPITAL | Age: 63
Setting detail: THERAPIES SERIES
Discharge: HOME | End: 2018-06-26

## 2018-06-26 DIAGNOSIS — M25.561 RIGHT KNEE PAIN, UNSPECIFIED CHRONICITY: Primary | ICD-10-CM

## 2018-06-26 NOTE — THERAPY TREATMENT NOTE
Outpatient Physical Therapy Ortho Treatment Note  [facility]     Patient Name: Tabitha Davila  : 1955  MRN: [id number]  Today's Date: 2018      Visit Date: 2018  Subjective Improvement:   70%    Attendance:   Approved:           MD follow up:    EFFIE MCFARLAND date:    18   Visit Dx:    ICD-10-CM ICD-9-CM   1. Right knee pain, unspecified chronicity M25.561 719.46       There is no problem list on file for this patient.       No past medical history on file.     Past Surgical History:   Procedure Laterality Date   •  SECTION     • CHOLECYSTECTOMY     • DILATION AND CURETTAGE, DIAGNOSTIC / THERAPEUTIC     • TONSILLECTOMY               PT Ortho     Row Name 18 1500       Subjective Comments    Subjective Comments Pt states she is clicking a lot with excs. Sore; a lot of walking over weekend with class reunion.  -PM       Precautions and Contraindications    Precautions/Limitations no known precautions/limitations  -PM       Subjective Pain    Able to rate subjective pain? yes  -PM    Pre-Treatment Pain Level 1  -PM    Post-Treatment Pain Level 1  -PM       Posture/Observations    Posture/Observations Comments No acute distress. Ambulates with no AD, non-antalgic this date.  -PM      User Key  (r) = Recorded By, (t) = Taken By, (c) = Cosigned By    Initials Name Provider Type    PM Sarika Mcginnis PTA Physical Therapy Assistant                            PT Assessment/Plan     Row Name 18 1500          PT Assessment    Assessment Comments Pt did well with all Rx this date. Noted swelling and tenderness pes ans bursa as well as ham, medially.  -PM     Rehab Potential Good  -PM     Patient/caregiver participated in establishment of treatment plan and goals Yes  -PM     Patient would benefit from skilled therapy intervention Yes  -PM        PT Plan    PT Frequency 2x/week  -PM     Predicted Duration of Therapy Intervention (Therapy Eval) 4-6 wks   -PM     PT Plan Comments Aquatic NV  -PM       User Key  (r) = Recorded By, (t) = Taken By, (c) = Cosigned By    Initials Name Provider Type    PM Sarika Mcginnis PTA Physical Therapy Assistant                Modalities     Row Name 06/26/18 1500             Ice    Patient reports will apply ice at home to involved area Yes   ice to go  -PM        User Key  (r) = Recorded By, (t) = Taken By, (c) = Cosigned By    Initials Name Provider Type    PM Sarika Mcginnis PTA Physical Therapy Assistant                Exercises     Row Name 06/26/18 1500             Subjective Comments    Subjective Comments Pt states she is clicking a lot with excs. Sore; a lot of walking over weekend with class reunion.  -PM         Subjective Pain    Able to rate subjective pain? yes  -PM      Pre-Treatment Pain Level 1  -PM      Post-Treatment Pain Level 1  -PM         Aquatics    Aquatics performed? No  -PM         Exercise 1    Exercise Name 1 Pro II for ROM/endurance  -PM      Time 1 10 min  -PM      Additional Comments L3.4  -PM         Exercise 2    Exercise Name 2 SL hip abd  -PM      Cueing 2 Verbal  -PM      Sets 2 2  -PM      Reps 2 10  -PM         Exercise 3    Exercise Name 3 SLR flex  -PM      Sets 3 2  -PM      Reps 3 10  -PM         Exercise 4    Exercise Name 4 Quad set  -PM      Sets 4 2  -PM      Reps 4 10  -PM         Exercise 5    Exercise Name 5 LAQ with hip add  -PM      Sets 5 2  -PM      Reps 5 10  -PM         Exercise 6    Exercise Name 6 HS curls seated  -PM      Sets 6 2  -PM      Reps 6 10  -PM      Additional Comments Red TB  -PM         Exercise 7    Exercise Name 7 TKE  -PM      Sets 7 2  -PM      Reps 7 10  -PM      Additional Comments red TB  -PM         Exercise 8    Exercise Name 8 Seated HS stretch  -PM      Sets 8 3  -PM      Time 8 30 sec  -PM         Exercise 9    Exercise Name 9 Slantboard Stretch  -PM      Sets 9 2  -PM      Time 9 30 sec  -PM         Exercise 10    Exercise Name 10 std hip  "abd B  -PM      Cueing 10 Verbal  -PM      Sets 10 2  -PM      Reps 10 10  -PM         Exercise 11    Exercise Name 11 std hip ext B  -PM      Cueing 11 Verbal  -PM      Sets 11 2  -PM      Reps 11 10  -PM         Exercise 12    Exercise Name 12 AirEx CR/TR, MS  -PM      Cueing 12 Verbal  -PM      Reps 12 2  -PM      Time 12 10  -PM        User Key  (r) = Recorded By, (t) = Taken By, (c) = Cosigned By    Initials Name Provider Type    PM Sarika Mcginnis PTA Physical Therapy Assistant                        Manual Rx (last 36 hours)      Manual Treatments     Row Name 06/26/18 1500             Manual Rx 1    Manual Rx 1 Location R medial hamstring and pes ans  -PM      Manual Rx 1 Type STM/MFR  -PM      Manual Rx 1 Duration 6 min  -PM        User Key  (r) = Recorded By, (t) = Taken By, (c) = Cosigned By    Initials Name Provider Type    PM Sarika Mcginnis PTA Physical Therapy Assistant                PT OP Goals     Row Name 06/26/18 1500          PT Short Term Goals    STG Date to Achieve 07/02/18  -PM     STG 1 Independent/compliant with HEP  -PM     STG 1 Progress Met;Ongoing  -PM     STG 2 Tolerate 45 minute treatment session without increased pain  -PM     STG 2 Progress Met  -PM     STG 3 Ambulate independently with no AD, non-antalgic gait, good heel strike noted  -PM     STG 3 Progress Progressing  -PM     STG 4 Maintain knee AROM WNL without increased pain  -PM     STG 4 Progress Partially Met  -PM     STG 5 Perform R SLS on level surface for 30\" or greater  -PM     STG 5 Progress Progressing  -PM        Long Term Goals    LTG Date to Achieve 07/16/18  -PM     LTG 1 Subjectively report 60% improvement or greater  -PM     LTG 1 Progress Progressing  -PM     LTG 2 Improve LEFS score to 40/80 or greater  -PM     LTG 2 Progress Progressing  -PM     LTG 3 Improve R hip flex/abd MMT to 4+/5 or greater  -PM     LTG 3 Progress Progressing  -PM     LTG 4 Demonstrate R knee flex/ext MMT to 5/5  -PM     LTG 4 " Progress Progressing  -PM     LTG 5 Ascend/descend 3 steps x 2, reciprocal pattern, no increased pain/compensation  -PM     LTG 5 Progress Progressing  -PM     LTG 6 Independent in aquatics/fitness & final HEP  -PM     LTG 6 Progress Progressing  -PM     LTG 7 Subjectively report improved TTP at medial hamstring by 75%  -PM     LTG 7 Progress Progressing  -PM        Time Calculation    PT Goal Re-Cert Due Date 07/02/18  -PM       User Key  (r) = Recorded By, (t) = Taken By, (c) = Cosigned By    Initials Name Provider Type    PM Sarika Mcginnis PTA Physical Therapy Assistant          Therapy Education  Given: HEP, Symptoms/condition management, Posture/body mechanics  Program: Reinforced  How Provided: Verbal  Provided to: Patient  Level of Understanding: Verbalized, Demonstrated              Time Calculation:   Start Time: 1517  Stop Time: 1605  Time Calculation (min): 48 min  Total Timed Code Minutes- PT: 48 minute(s)  Therapy Suggested Charges     Code   Minutes Charges    None           Therapy Charges for Today     Code Description Service Date Service Provider Modifiers Qty    65454563866 HC PT THER SUPP EA 15 MIN 6/26/2018 Sarika Mcginnis PTA GP 3                    Sarika Mcginnis PTA  6/26/2018

## 2018-06-28 ENCOUNTER — HOSPITAL ENCOUNTER (OUTPATIENT)
Dept: PHYSICAL THERAPY | Facility: HOSPITAL | Age: 63
Setting detail: THERAPIES SERIES
Discharge: HOME | End: 2018-06-28

## 2018-06-28 DIAGNOSIS — M25.561 RIGHT KNEE PAIN, UNSPECIFIED CHRONICITY: Primary | ICD-10-CM

## 2018-06-28 PROCEDURE — 97110 THERAPEUTIC EXERCISES: CPT

## 2018-06-28 PROCEDURE — 97140 MANUAL THERAPY 1/> REGIONS: CPT

## 2018-06-28 NOTE — THERAPY TREATMENT NOTE
"    Outpatient Physical Therapy Ortho Treatment Note  [facility]  Suzy Caruso, PTA  18  4:06 PM       Patient Name: Tabitha Davila  : 1955  MRN: [id number]  Today's Date: 2018      Visit Date: 2018    Subjective Improvement: 70%      Attendance:    Approved: 20 per year           MD follow up: TBD          RC date: 18         Visit Dx:    ICD-10-CM ICD-9-CM   1. Right knee pain, unspecified chronicity M25.561 719.46       There is no problem list on file for this patient.       No past medical history on file.     Past Surgical History:   Procedure Laterality Date   •  SECTION     • CHOLECYSTECTOMY     • DILATION AND CURETTAGE, DIAGNOSTIC / THERAPEUTIC     • TONSILLECTOMY               PT Ortho     Row Name 18 1500       Subjective Comments    Subjective Comments pt states that shes not having any pain but just some soreness  -KM       Precautions and Contraindications    Precautions/Limitations no known precautions/limitations  -KM       Subjective Pain    Able to rate subjective pain? yes  -KM    Pre-Treatment Pain Level --   \"sore\"  -KM    Post-Treatment Pain Level --   \"SORE\"  -KM       Posture/Observations    Posture/Observations Comments No acute distress. Ambulates with no AD, non-antalgic this date.  -KM    Row Name 18 1500       Subjective Comments    Subjective Comments Pt states she is clicking a lot with excs. Sore; a lot of walking over weekend with class reunion.  -PM       Precautions and Contraindications    Precautions/Limitations no known precautions/limitations  -PM       Subjective Pain    Able to rate subjective pain? yes  -PM    Pre-Treatment Pain Level 1  -PM    Post-Treatment Pain Level 1  -PM       Posture/Observations    Posture/Observations Comments No acute distress. Ambulates with no AD, non-antalgic this date.  -PM      User Key  (r) = Recorded By, (t) = Taken By, (c) = Cosigned By    Initials Name Provider " "Type    PM Sarika Mcginnis [facility] Physical Therapy Assistant     Suzy Muhammad [facility] Physical Therapy Assistant                            PT Assessment/Plan     Row Name 06/28/18 1500          PT Assessment    Functional Limitations Decreased safety during functional activities;Impaired gait;Limitation in home management;Limitations in community activities;Limitations in functional capacity and performance;Performance in leisure activities;Performance in work activities  -KM     Impairments Balance;Gait;Edema;Impaired flexibility;Impaired muscle endurance;Muscle strength;Pain;Range of motion  -KM     Assessment Comments pt did well with all therex. pt only complained of soreness. pt was TTP to HS and medial knee.   -KM     Rehab Potential Good  -KM     Patient/caregiver participated in establishment of treatment plan and goals Yes  -KM     Patient would benefit from skilled therapy intervention Yes  -KM        PT Plan    PT Frequency 2x/week  -KM     Predicted Duration of Therapy Intervention (Therapy Eval) 4-6 weeks  -KM     PT Plan Comments Possible step ups next  -KM       User Key  (r) = Recorded By, (t) = Taken By, (c) = Cosigned By    Initials Name Provider Type     Suzy Muhammad [facility] Physical Therapy Assistant                Modalities     Row Name 06/28/18 1500             Ice    Patient reports will apply ice at home to involved area Yes   ice to go  -KM        User Key  (r) = Recorded By, (t) = Taken By, (c) = Cosigned By    Initials Name Provider Type    BLAYNE Muhammad PTA Physical Therapy Assistant                Exercises     Row Name 06/28/18 1500             Subjective Comments    Subjective Comments pt states that shes not having any pain but just some soreness  -KM         Subjective Pain    Able to rate subjective pain? yes  -KM      Pre-Treatment Pain Level --   \"sore\"  -KM      Post-Treatment Pain Level --   \"SORE\"  -KM         Aquatics    Aquatics performed? No  " -KM         Exercise 1    Exercise Name 1 Pro II for ROM/endurance  -KM      Time 1 10 min  -KM         Exercise 2    Exercise Name 2 SL hip abd  -KM      Cueing 2 Verbal  -KM      Sets 2 2  -KM      Reps 2 10  -KM         Exercise 3    Exercise Name 3 SLR flex  -KM      Sets 3 2  -KM      Reps 3 10  -KM         Exercise 4    Exercise Name 4 Quad set  -KM      Sets 4 2  -KM      Reps 4 10  -KM         Exercise 5    Exercise Name 5 LAQ with hip add  -KM      Sets 5 2  -KM      Reps 5 10  -KM         Exercise 6    Exercise Name 6 HS curls seated  -KM      Sets 6 2  -KM      Reps 6 10  -KM      Additional Comments red  -KM         Exercise 7    Exercise Name 7 TKE  -KM      Sets 7 2  -KM      Reps 7 10  -KM      Additional Comments red  -KM         Exercise 8    Exercise Name 8 Seated HS stretch  -KM      Sets 8 3  -KM      Time 8 30 sec  -KM         Exercise 9    Exercise Name 9 Slantboard Stretch  -KM      Sets 9 2  -KM      Time 9 30 sec  -KM         Exercise 10    Exercise Name 10 std hip abd B  -KM      Cueing 10 Verbal  -KM      Sets 10 2  -KM      Reps 10 10  -KM         Exercise 11    Exercise Name 11 std hip ext B  -KM      Cueing 11 Verbal  -KM      Sets 11 2  -KM      Reps 11 10  -KM         Exercise 12    Exercise Name 12 AirEx CR/TR, MS  -KM      Cueing 12 Verbal  -KM      Reps 12 2  -KM      Time 12 10  -KM         Exercise 13    Exercise Name 13 Airex march  -KM      Sets 13 2  -KM      Reps 13 10  -KM        User Key  (r) = Recorded By, (t) = Taken By, (c) = Cosigned By    Initials Name Provider Type    BLAYNE Muhammad, PTA Physical Therapy Assistant                        Manual Rx (last 36 hours)      Manual Treatments     Row Name 06/28/18 1500             Manual Rx 1    Manual Rx 1 Location R medial hamstring and pes ans  -KM      Manual Rx 1 Type STM/MFR  -KM      Manual Rx 1 Duration 8 min  -KM        User Key  (r) = Recorded By, (t) = Taken By, (c) = Cosigned By    Initials Name Provider  "Type    BLAYNE Muhammad PTA Physical Therapy Assistant                PT OP Goals     Row Name 06/28/18 1600 06/28/18 1500       PT Short Term Goals    STG Date to Achieve  -- 07/02/18  -KM    STG 1  -- Independent/compliant with HEP  -KM    STG 1 Progress  -- Met;Ongoing  -KM    STG 2  -- Tolerate 45 minute treatment session without increased pain  -KM    STG 2 Progress  -- Met  -KM    STG 3  -- Ambulate independently with no AD, non-antalgic gait, good heel strike noted  -KM    STG 3 Progress  -- Progressing  -KM    STG 4  -- Maintain knee AROM WNL without increased pain  -KM    STG 4 Progress  -- Partially Met  -KM    STG 5  -- Perform R SLS on level surface for 30\" or greater  -KM    STG 5 Progress  -- Progressing  -KM       Long Term Goals    LTG Date to Achieve  -- 07/16/18  -KM    LTG 1  -- Subjectively report 60% improvement or greater  -KM    LTG 1 Progress  -- Progressing  -KM    LTG 2  -- Improve LEFS score to 40/80 or greater  -KM    LTG 2 Progress  -- Progressing  -KM    LTG 3  -- Improve R hip flex/abd MMT to 4+/5 or greater  -KM    LTG 3 Progress  -- Progressing  -KM    LTG 4  -- Demonstrate R knee flex/ext MMT to 5/5  -KM    LTG 4 Progress  -- Progressing  -KM    LTG 5  -- Ascend/descend 3 steps x 2, reciprocal pattern, no increased pain/compensation  -KM    LTG 5 Progress  -- Progressing  -KM    LTG 6  -- Independent in aquatics/fitness & final HEP  -KM    LTG 6 Progress  -- Progressing  -KM    LTG 7  -- Subjectively report improved TTP at medial hamstring by 75%  -    LTG 7 Progress  -- Progressing  -KM       Time Calculation    PT Goal Re-Cert Due Date 07/02/18  -KM  --      User Key  (r) = Recorded By, (t) = Taken By, (c) = Cosigned By    Initials Name Provider Type    BLAYNE Muhammad PTA Physical Therapy Assistant          Therapy Education  Given: HEP, Symptoms/condition management, Posture/body mechanics  Program: Reinforced  How Provided: Verbal  Provided to: " Patient  Level of Understanding: Verbalized, Demonstrated              Time Calculation:   Start Time: 1515  Stop Time: 1600  Time Calculation (min): 45 min  Total Timed Code Minutes- PT: 45 minute(s)  Therapy Suggested Charges     Code   Minutes Charges    None           Therapy Charges for Today     Code Description Service Date Service Provider Modifiers Qty    82074758803 HC PT THER PROC EA 15 MIN 6/28/2018 Suzy Caruso, PTA GP 2    13007663430 HC PT MANUAL THERAPY EA 15 MIN 6/28/2018 Suzy Caruso, PTA GP 1    [id number] HC PT THER SUPP EA 15 MIN 6/28/2018 Suzy Caruso, PTA GP 1                    Suzy Caruso, PTA  6/28/2018

## 2018-07-03 ENCOUNTER — HOSPITAL ENCOUNTER (OUTPATIENT)
Dept: PHYSICAL THERAPY | Facility: HOSPITAL | Age: 63
Setting detail: THERAPIES SERIES
Discharge: HOME | End: 2018-07-03

## 2018-07-03 DIAGNOSIS — M25.561 RIGHT KNEE PAIN, UNSPECIFIED CHRONICITY: Primary | ICD-10-CM

## 2018-07-03 PROCEDURE — 97140 MANUAL THERAPY 1/> REGIONS: CPT | Performed by: PHYSICAL THERAPIST

## 2018-07-03 PROCEDURE — G8979 MOBILITY GOAL STATUS: HCPCS | Performed by: PHYSICAL THERAPIST

## 2018-07-03 PROCEDURE — G8978 MOBILITY CURRENT STATUS: HCPCS | Performed by: PHYSICAL THERAPIST

## 2018-07-03 PROCEDURE — 97110 THERAPEUTIC EXERCISES: CPT | Performed by: PHYSICAL THERAPIST

## 2018-07-03 NOTE — THERAPY PROGRESS REPORT/RE-CERT
Outpatient Physical Therapy Ortho Progress Note  [facility], [place], operated by [facility]     Patient Name: Tabitha Davila  : 1955  MRN: [id number]  Today's Date: 7/3/2018      Visit Date: 2018     Subjective Improvement: 90%      Attendance:    Approved: 20 per year           MD follow up: 18          RC date: 18         There is no problem list on file for this patient.       No past medical history on file.     Past Surgical History:   Procedure Laterality Date   •  SECTION     • CHOLECYSTECTOMY     • DILATION AND CURETTAGE, DIAGNOSTIC / THERAPEUTIC     • TONSILLECTOMY         Visit Dx:     ICD-10-CM ICD-9-CM   1. Right knee pain, unspecified chronicity M25.561 719.46                 PT Ortho     Row Name 18 1500       Subjective Comments    Subjective Comments Pt states she's very happy with her progress. Not having uh pain anymore, mostly soreness, & mostly at posterior knee. Does have pain/discomfort ascending stairs when leading with RLE. Reports 90% subjective improvement at this time.  -KG       Precautions and Contraindications    Precautions/Limitations no known precautions/limitations  -KG       Subjective Pain    Pre-Treatment Pain Level 1  -KG    Post-Treatment Pain Level 1  -KG       Posture/Observations    Posture/Observations Comments No acute distress. Ambulates with no AD, non-antalgic gait present. Slight decreased stance time RLE noted post treatment but very minor. Continues to have TTP/tightness at medial hamstring.  -KG       Knee Palpation    Pes Anserine Bursa Right:;Tender  -KG    Semimembranosis Right:;Tender;Guarded/taut  -KG       Knee Special Tests    Thessaly test (meniscal lesion) Right:;Negative  -KG    Sasha’s test (meniscal lesion) Right:;Negative  -KG    Bounce home test (meniscal lesion) Right:;Negative  -KG       Right Lower Ext    Rt Knee Extension/Flexion AROM 0-126  -KG    RT Lower Extremity Comments No pain  -KG       Right Hip (Manual  "Muscle Testing)    MMT, Right Hip: Manual Muscle Testing (MMT) Flex 4+/5, abd 4+/5, add 4+/5  -KG       Right Knee (Manual Muscle Testing)    Comment, Right Knee: Manual Muscle Testing (MMT) Flex 4+/5 with pain post knee, ext 4+/5  -KG       Balance Skills Training    SLS R SLS 5\"  -KG      User Key  (r) = Recorded By, (t) = Taken By, (c) = Cosigned By    Initials Name Provider Type    KG Zayda Barlow, PT Physical Therapist                      Therapy Education  Given: HEP, Symptoms/condition management, Pain management  Program: Reinforced  How Provided: Verbal  Provided to: Patient  Level of Understanding: Verbalized, Demonstrated           PT OP Goals     Row Name 07/03/18 1500          PT Short Term Goals    STG Date to Achieve 07/10/18  -KG     STG 1 Independent/compliant with HEP  -KG     STG 1 Progress Met  -KG     STG 2 Tolerate 45 minute treatment session without increased pain  -KG     STG 2 Progress Met  -KG     STG 3 Ambulate independently with no AD, non-antalgic gait, good heel strike noted  -KG     STG 3 Progress Met  -KG     STG 4 Maintain knee AROM WNL without increased pain  -KG     STG 4 Progress Met  -KG     STG 5 Perform R SLS on level surface for 15\" or greater  -KG     STG 5 Progress Goal Revised  -KG        Long Term Goals    LTG Date to Achieve 07/24/18  -KG     LTG 1 Subjectively report 60% improvement or greater  -KG     LTG 1 Progress Met  -KG     LTG 1 Progress Comments '  -KG     LTG 2 Improve LEFS score to 40/80 or greater  -KG     LTG 2 Progress Met  -KG     LTG 3 Improve R hip flex/abd MMT to 4+/5 or greater  -KG     LTG 3 Progress Met  -KG     LTG 4 Demonstrate R knee flex/ext MMT to 5/5  -KG     LTG 4 Progress Partially Met;Progressing  -KG     LTG 5 Ascend/descend 3 steps x 2, reciprocal pattern, no increased pain/compensation  -KG     LTG 5 Progress Progressing  -KG     LTG 6 Independent in aquatics/fitness & final HEP  -KG     LTG 6 Progress Progressing  -KG     LTG 7 " Subjectively report improved TTP at medial hamstring by 75%  -KG     LTG 7 Progress Progressing  -KG        Time Calculation    PT Goal Re-Cert Due Date 07/24/18  -KG       User Key  (r) = Recorded By, (t) = Taken By, (c) = Cosigned By    Initials Name Provider Type    KG Zayda Barlow, PT Physical Therapist                PT Assessment/Plan     Row Name 07/03/18 1500          PT Assessment    Functional Limitations Decreased safety during functional activities;Impaired gait;Limitation in home management;Limitations in community activities;Limitations in functional capacity and performance;Performance in leisure activities;Performance in work activities  -KG     Impairments Balance;Gait;Edema;Impaired flexibility;Impaired muscle endurance;Muscle strength;Pain;Range of motion  -KG     Assessment Comments Pt is progressing very well with PT at this time as evidenced by improvements in knee ROM, hip/knee strength, & increased activity tolerance. Pt reports 90% improvement at this time. Follows up with MD on 7/15/18. Continues to have increased soreness/pain with ascending stairs when leading with RLE. Quad/knee weakness still present but much improved since initial eval. Improve gait mechanics noted as well. Continues with TTP at medial hamstring & pes anserine improved. Pt will continue to benefit from skilled PT for further improvements in functional strength/activity tolerance.  -KG     Rehab Potential Good  -KG     Patient/caregiver participated in establishment of treatment plan and goals Yes  -KG     Patient would benefit from skilled therapy intervention Yes  -KG        PT Plan    PT Frequency 2x/week  -KG     Predicted Duration of Therapy Intervention (Therapy Eval) 1-2  more weeks  -KG     PT Plan Comments Possible d/c next visit pending MD appt. See for 1-2 more weeks to progress towards comprehensive HEP, as pt reports 90% improvement at this time.  -KG       User Key  (r) = Recorded By, (t) = Taken By,  "(c) = Cosigned By    Initials Name Provider Type    KG Zayda Barlow, PT Physical Therapist                Modalities     Row Name 07/03/18 1500             Ice    Patient denies application of Ice Yes  -KG      Patient reports will apply ice at home to involved area Yes  -KG        User Key  (r) = Recorded By, (t) = Taken By, (c) = Cosigned By    Initials Name Provider Type    KG Zayda Barlow, PT Physical Therapist              Exercises     Row Name 07/03/18 1500             Subjective Comments    Subjective Comments Pt states she's very happy with her progress. Not having uh pain anymore, mostly soreness, & mostly at posterior knee. Does have pain/discomfort ascending stairs when leading with RLE. Reports 90% subjective improvement at this time.  -KG         Subjective Pain    Able to rate subjective pain? yes  -KG      Pre-Treatment Pain Level 1  -KG      Post-Treatment Pain Level 1  -KG         Aquatics    Aquatics performed? No  -KG         Exercise 1    Exercise Name 1 Pro II for ROM/endurance  -KG      Time 1 10 min  -KG      Additional Comments L 4.0  -KG         Exercise 2    Exercise Name 2 Incline stretch  -KG      Reps 2 2  -KG      Time 2 30\" hold  -KG         Exercise 3    Exercise Name 3 Standing HS stretch  -KG      Reps 3 2  -KG      Time 3 30\" hold  -KG         Exercise 4    Exercise Name 4 Reciprocal stairs  -KG      Sets 4 1  -KG      Reps 4 5  -KG      Additional Comments 3 steps x 5  -KG         Exercise 5    Exercise Name 5 Airex CR/TR  -KG      Sets 5 2  -KG      Reps 5 10  -KG         Exercise 6    Exercise Name 6 Airex mini squats  -KG      Cueing 6 Verbal  -KG      Sets 6 2  -KG      Reps 6 10  -KG         Exercise 7    Exercise Name 7 R SLS stance  -KG      Reps 7 2  -KG      Time 7 30\" hold  -KG      Additional Comments Finger sweep  -KG         Exercise 8    Exercise Name 8 Fwd step ups 6\"  -KG      Sets 8 2  -KG      Reps 8 10  -KG         Exercise 9    Exercise Name 9 Lat " "step ups 6\"  -KG      Sets 9 2  -KG      Reps 9 10  -KG         Exercise 10    Exercise Name 10 SLR FLex  -KG      Sets 10 2  -KG      Reps 10 10  -KG         Exercise 11    Exercise Name 11 LAQ + hip add squeeze  -KG      Sets 11 2  -KG      Reps 11 10  -KG        User Key  (r) = Recorded By, (t) = Taken By, (c) = Cosigned By    Initials Name Provider Type    IVON Barlow, PT Physical Therapist           Manual Rx (last 36 hours)      Manual Treatments     Row Name 07/03/18 1500             Manual Rx 1    Manual Rx 1 Location R medial hamstring and pes ans  -KG      Manual Rx 1 Type STM/MFR  -KG      Manual Rx 1 Duration 8 min  -KG        User Key  (r) = Recorded By, (t) = Taken By, (c) = Cosigned By    Initials Name Provider Type    IVON Barlow, PT Physical Therapist                      Outcome Measure Options: Lower Extremity Functional Scale (LEFS)  Lower Extremity Functional Index  Any of your usual work, housework or school activities: No difficulty  Your usual hobbies, recreational or sporting activities: No difficulty  Getting into or out of the bath: No difficulty  Walking between rooms: No difficulty  Putting on your shoes or socks: No difficulty  Squatting: No difficulty  Lifting an object, like a bag of groceries from the floor: No difficulty  Performing light activities around your home: No difficulty  Performing heavy activities around your home: No difficulty  Getting into or out of a car: No difficulty  Walking 2 blocks: Moderate difficulty  Walking a mile: A little bit of difficulty  Going up or down 10 stairs (about 1 flight of stairs): Moderate difficulty  Standing for 1 hour: A little bit of difficulty  Sitting for 1 hour: A little bit of difficulty  Running on even ground: Quite a bit of difficulty  Running on uneven ground: Quite a bit of difficulty  Making sharp turns while running fast: Quite a bit of difficulty  Hopping: Quite a bit of difficulty  Rolling over in bed: No " difficulty  Total: 61      Time Calculation:     Therapy Suggested Charges     Code   Minutes Charges    None             Start Time: 1520  Stop Time: 1605  Time Calculation (min): 45 min  Total Timed Code Minutes- PT: 45 minute(s)     Therapy Charges for Today     Code Description Service Date Service Provider Modifiers Qty    89798422586 HC PT MOBILITY CURRENT 7/3/2018 Zayda Barlow, PT GP, CJ 1    37610487947 HC PT MOBILITY PROJECTED 7/3/2018 Zayda Barlow, PT GP, CI 1    97104201694 HC PT THER PROC EA 15 MIN 7/3/2018 Zayda Barlow, PT GP 2    79620419412 HC PT MANUAL THERAPY EA 15 MIN 7/3/2018 Zayda Barlow, PT GP 1          PT G-Codes  PT Professional Judgement Used?: Yes  Outcome Measure Options: Lower Extremity Functional Scale (LEFS)  Score: 61/80  Functional Limitation: Mobility: Walking and moving around  Mobility: Walking and Moving Around Current Status (): At least 20 percent but less than 40 percent impaired, limited or restricted  Mobility: Walking and Moving Around Goal Status (): At least 1 percent but less than 20 percent impaired, limited or restricted         Zayda Barlow, PT  7/3/2018

## 2018-07-05 ENCOUNTER — HOSPITAL ENCOUNTER (OUTPATIENT)
Dept: PHYSICAL THERAPY | Facility: HOSPITAL | Age: 63
Setting detail: THERAPIES SERIES
Discharge: HOME | End: 2018-07-05

## 2018-07-05 DIAGNOSIS — M25.561 RIGHT KNEE PAIN, UNSPECIFIED CHRONICITY: Primary | ICD-10-CM

## 2018-07-05 PROCEDURE — 97113 AQUATIC THERAPY/EXERCISES: CPT

## 2018-07-05 NOTE — THERAPY TREATMENT NOTE
Outpatient Physical Therapy Ortho Treatment Note  [facility]     Patient Name: Tabitha Davila  : 1955  MRN: [id number]  Today's Date: 2018      Visit Date: 2018  Subjective Improvement: 90%      Attendance:    Approved: 20 per year           MD follow up: MARIELY           date: 18       Visit Dx:    ICD-10-CM ICD-9-CM   1. Right knee pain, unspecified chronicity M25.561 719.46       There is no problem list on file for this patient.       No past medical history on file.     Past Surgical History:   Procedure Laterality Date   •  SECTION     • CHOLECYSTECTOMY     • DILATION AND CURETTAGE, DIAGNOSTIC / THERAPEUTIC     • TONSILLECTOMY               PT Ortho     Row Name 18 1600       Subjective Comments    Subjective Comments Pt states she is doing pretty good today. Pt notes side step up starting to hurt.  -PM       Precautions and Contraindications    Precautions/Limitations no known precautions/limitations  -PM       Subjective Pain    Able to rate subjective pain? yes  -PM    Pre-Treatment Pain Level 0  -PM    Post-Treatment Pain Level 1  -PM    Row Name 18 1500       Subjective Comments    Subjective Comments Pt states she's very happy with her progress. Not having uh pain anymore, mostly soreness, & mostly at posterior knee. Does have pain/discomfort ascending stairs when leading with RLE. Reports 90% subjective improvement at this time.  -KG       Precautions and Contraindications    Precautions/Limitations no known precautions/limitations  -KG       Subjective Pain    Pre-Treatment Pain Level 1  -KG    Post-Treatment Pain Level 1  -KG       Posture/Observations    Posture/Observations Comments No acute distress. Ambulates with no AD, non-antalgic gait present. Slight decreased stance time RLE noted post treatment but very minor. Continues to have TTP/tightness at medial hamstring.  -KG       Knee Palpation    Pes Anserine Bursa Right:;Tender  " -KG    Semimembranosis Right:;Tender;Guarded/taut  -KG       Knee Special Tests    Thessaly test (meniscal lesion) Right:;Negative  -KG    Sasha’s test (meniscal lesion) Right:;Negative  -KG    Bounce home test (meniscal lesion) Right:;Negative  -KG       Right Lower Ext    Rt Knee Extension/Flexion AROM 0-126  -KG    RT Lower Extremity Comments No pain  -KG       Right Hip (Manual Muscle Testing)    MMT, Right Hip: Manual Muscle Testing (MMT) Flex 4+/5, abd 4+/5, add 4+/5  -KG       Right Knee (Manual Muscle Testing)    Comment, Right Knee: Manual Muscle Testing (MMT) Flex 4+/5 with pain post knee, ext 4+/5  -KG       Balance Skills Training    SLS R SLS 5\"  -KG      User Key  (r) = Recorded By, (t) = Taken By, (c) = Cosigned By    Initials Name Provider Type    PM Sarika Mcginnis PTA Physical Therapy Assistant    KG Zayda Barlow, PT Physical Therapist                            PT Assessment/Plan     Row Name 07/05/18 1600          PT Assessment    Assessment Comments Pt able to progress hydro resistive ther ex/activities. Lat step up incr pn so stopped. Pt had some soreness noted post Rx but related not really pain. Moves well in Rx.  -PM     Rehab Potential Good  -PM     Patient/caregiver participated in establishment of treatment plan and goals Yes  -PM     Patient would benefit from skilled therapy intervention Yes  -PM        PT Plan    PT Frequency 2x/week  -PM     Predicted Duration of Therapy Intervention (Therapy Eval) 1-2 more wks  -PM     PT Plan Comments AQ float steps; possible LC to AQ walk to incr resist as kobi  -PM       User Key  (r) = Recorded By, (t) = Taken By, (c) = Cosigned By    Initials Name Provider Type    PM Sarika Mcginnis PTA Physical Therapy Assistant                    Exercises     Row Name 07/05/18 1600             Subjective Comments    Subjective Comments Pt states she is doing pretty good today. Pt notes side step up starting to hurt.  -PM         Subjective Pain " "   Able to rate subjective pain? yes  -PM      Pre-Treatment Pain Level 0  -PM      Post-Treatment Pain Level 1  -PM         Aquatics    Aquatics performed? Yes  -PM         Aquatics LE    Water Walk forward;side;backward;Other (comment)   F/B 4', lateral 4', march walk 3'  -PM      Stretch 1 --  -PM      Vertical Traction 2 min relax  -PM      Hip Abd/Add 2x10; bilateral   small RF hydro resist  -PM      Hip Flex/Ext 2x10; bilateral   sm RF hydroresist  -PM      Mini Squat 25  -PM      Toe/Heel Raises 25  -PM      Step Ups 2x10   FF; lat step up x 11 reps  -PM      Bicycle 3 min deep water  -PM      Flutter/Scissor 1 min deep water each direction  -PM         Exercise 1    Exercise Name 1 AQ: Float step march with BRF  -PM      Sets 1 2  -PM      Reps 1 10  -PM         Exercise 2    Exercise Name 2 AQ: Hamstring curl  -PM      Sets 2 2  -PM      Reps 2 10  -PM         Exercise 3    Exercise Name 3 AQ: Static lunges  -PM      Cueing 3 Verbal  -PM      Sets 3 2  -PM      Reps 3 10  -PM        User Key  (r) = Recorded By, (t) = Taken By, (c) = Cosigned By    Initials Name Provider Type    PM Sarika Mcginnis PTA Physical Therapy Assistant                               PT OP Goals     Row Name 07/05/18 1600          PT Short Term Goals    STG Date to Achieve 07/10/18  -PM     STG 1 Independent/compliant with HEP  -PM     STG 1 Progress Met  -PM     STG 2 Tolerate 45 minute treatment session without increased pain  -PM     STG 2 Progress Met  -PM     STG 3 Ambulate independently with no AD, non-antalgic gait, good heel strike noted  -PM     STG 3 Progress Met  -PM     STG 4 Maintain knee AROM WNL without increased pain  -PM     STG 4 Progress Met  -PM     STG 5 Perform R SLS on level surface for 15\" or greater  -PM     STG 5 Progress Goal Revised  -PM        Long Term Goals    LTG Date to Achieve 07/24/18  -PM     LTG 1 Subjectively report 60% improvement or greater  -PM     LTG 1 Progress Met  -PM     LTG 2 Improve " LEFS score to 40/80 or greater  -PM     LTG 2 Progress Met  -PM     LTG 3 Improve R hip flex/abd MMT to 4+/5 or greater  -PM     LTG 3 Progress Met  -PM     LTG 4 Demonstrate R knee flex/ext MMT to 5/5  -PM     LTG 4 Progress Partially Met;Progressing  -PM     LTG 5 Ascend/descend 3 steps x 2, reciprocal pattern, no increased pain/compensation  -PM     LTG 5 Progress Progressing  -PM     LTG 6 Independent in aquatics/fitness & final HEP  -PM     LTG 6 Progress Progressing  -PM     LTG 7 Subjectively report improved TTP at medial hamstring by 75%  -PM     LTG 7 Progress Progressing  -PM        Time Calculation    PT Goal Re-Cert Due Date 07/24/18  -PM       User Key  (r) = Recorded By, (t) = Taken By, (c) = Cosigned By    Initials Name Provider Type    PM Sarika Mcginnis PTA Physical Therapy Assistant          Therapy Education  Given: HEP, Symptoms/condition management, Pain management  Program: Reinforced  How Provided: Verbal  Provided to: Patient  Level of Understanding: Verbalized, Demonstrated              Time Calculation:   Start Time: 1605  Stop Time: 1650  Time Calculation (min): 45 min  Total Timed Code Minutes- PT: 45 minute(s)  Therapy Suggested Charges     Code   Minutes Charges    None           Therapy Charges for Today     Code Description Service Date Service Provider Modifiers Qty    59701276195 HC PT AQUATIC THERAPY EA 15 MIN 7/5/2018 Sarika Mcginnis PTA GP 3                    Sarika Mcginnis PTA  7/5/2018

## 2018-07-10 ENCOUNTER — HOSPITAL ENCOUNTER (OUTPATIENT)
Dept: PHYSICAL THERAPY | Facility: HOSPITAL | Age: 63
Setting detail: THERAPIES SERIES
Discharge: HOME | End: 2018-07-10

## 2018-07-10 DIAGNOSIS — Z12.31 ENCOUNTER FOR SCREENING MAMMOGRAM FOR BREAST CANCER: Primary | ICD-10-CM

## 2018-07-10 DIAGNOSIS — M25.561 RIGHT KNEE PAIN, UNSPECIFIED CHRONICITY: Primary | ICD-10-CM

## 2018-07-10 PROCEDURE — 97110 THERAPEUTIC EXERCISES: CPT

## 2018-07-10 PROCEDURE — 97140 MANUAL THERAPY 1/> REGIONS: CPT

## 2018-07-10 NOTE — THERAPY TREATMENT NOTE
"    Outpatient Physical Therapy Ortho Treatment Note  [facility]  Suzy Muhammad PTA  07/10/18  5:00 PM       Patient Name: Tabitha Davila  : 1955  MRN: [id number]  Today's Date: 7/10/2018      Visit Date: 07/10/2018    Subjective Improvement: 90%      Attendance:    Approved: 20 per year          MD follow up: PRN          RC date: 18         Visit Dx:    ICD-10-CM ICD-9-CM   1. Right knee pain, unspecified chronicity M25.561 719.46       There is no problem list on file for this patient.       No past medical history on file.     Past Surgical History:   Procedure Laterality Date   •  SECTION     • CHOLECYSTECTOMY     • DILATION AND CURETTAGE, DIAGNOSTIC / THERAPEUTIC     • TONSILLECTOMY               PT Ortho     Row Name 07/10/18 1500       Subjective Comments    Subjective Comments pt states that she has had company and has been on the go for a couple of days and knee is sore  -KM       Precautions and Contraindications    Precautions/Limitations no known precautions/limitations  -KM       Subjective Pain    Able to rate subjective pain? yes  -KM    Pre-Treatment Pain Level --   \"sore\"  -KM    Post-Treatment Pain Level --   \"better\"  -KM       Posture/Observations    Posture/Observations Comments No acute distress. Ambulates with no AD, non-antalgic gait present. Slight decreased stance time RLE noted post treatment but very minor. Continues to have TTP/tightness at medial hamstring.  -KM      User Key  (r) = Recorded By, (t) = Taken By, (c) = Cosigned By    Initials Name Provider Type     Suzy Muhammad PTA Physical Therapy Assistant                            PT Assessment/Plan     Row Name 07/10/18 1500          PT Assessment    Functional Limitations Decreased safety during functional activities;Impaired gait;Limitation in home management;Limitations in community activities;Limitations in functional capacity and performance;Performance in leisure " "activities;Performance in work activities  -KM     Impairments Balance;Gait;Edema;Impaired flexibility;Impaired muscle endurance;Muscle strength;Pain;Range of motion  -KM     Assessment Comments pt challenged with BOSU lat step ups- some pain reported in medial and posterior knee with those. pt still TTP to HS and had decrease pain/soreness post STM/MFR.   -KM     Rehab Potential Good  -KM     Patient/caregiver participated in establishment of treatment plan and goals Yes  -KM     Patient would benefit from skilled therapy intervention Yes  -KM        PT Plan    PT Frequency 2x/week  -KM     Predicted Duration of Therapy Intervention (Therapy Eval) 1-2 more weeks  -KM     PT Plan Comments Possibly D/C next week depending on pt progress  -KM       User Key  (r) = Recorded By, (t) = Taken By, (c) = Cosigned By    Initials Name Provider Type     Suzy Muhammad, PTA Physical Therapy Assistant                    Exercises     Row Name 07/10/18 1500             Subjective Comments    Subjective Comments pt states that she has had company and has been on the go for a couple of days and knee is sore  -KM         Subjective Pain    Able to rate subjective pain? yes  -KM      Pre-Treatment Pain Level --   \"sore\"  -KM      Post-Treatment Pain Level --   \"better\"  -KM         Aquatics    Aquatics performed? No  -KM         Exercise 1    Exercise Name 1 PRO II for ROM  -KM      Time 1 10 min  -KM      Additional Comments L4.0  -KM         Exercise 2    Exercise Name 2 Incline S  -KM      Reps 2 2  -KM      Time 2 30 sec hold  -KM         Exercise 3    Exercise Name 3 HS S  -KM      Reps 3 2  -KM      Time 3 30 sec hold  -KM         Exercise 4    Exercise Name 4 Aierx CR/TR  -KM      Sets 4 2  -KM      Reps 4 10  -KM         Exercise 5    Exercise Name 5 Airex mini squats  -KM      Sets 5 2  -KM      Reps 5 10  -KM         Exercise 6    Exercise Name 6 Airex march no UE support  -KM      Sets 6 2  -KM      Reps 6 10  -KM " "        Exercise 7    Exercise Name 7 BOSU fwd step ups   -KM      Sets 7 2  -KM      Reps 7 10  -KM         Exercise 8    Exercise Name 8 BOSU lat step ups  -KM      Sets 8 2  -KM      Reps 8 10  -KM         Exercise 9    Exercise Name 9 ST hip abd  -KM      Sets 9 2  -KM      Reps 9 10  -KM         Exercise 10    Exercise Name 10 ST hip ext  -KM      Sets 10 2  -KM      Reps 10 10  -KM         Exercise 11    Exercise Name 11 Seated HS curl  -KM      Sets 11 2  -KM      Reps 11 10  -KM      Additional Comments red  -KM         Exercise 12    Exercise Name 12 Seated LAQ + hip add squeezes  -KM      Reps 12 20  -KM      Time 12 5 sec hold  -KM        User Key  (r) = Recorded By, (t) = Taken By, (c) = Cosigned By    Initials Name Provider Type    BLAYNE Muhammad PTA Physical Therapy Assistant                        Manual Rx (last 36 hours)      Manual Treatments     Row Name 07/10/18 1500             Manual Rx 1    Manual Rx 1 Location R medial hamstring and pes ans  -KM      Manual Rx 1 Type STM/MFR  -KM      Manual Rx 1 Duration 8 min  -KM        User Key  (r) = Recorded By, (t) = Taken By, (c) = Cosigned By    Initials Name Provider Type    BLAYNE Muhammad PTA Physical Therapy Assistant                PT OP Goals     Row Name 07/10/18 1600          PT Short Term Goals    STG Date to Achieve 07/10/18  -KM     STG 1 Independent/compliant with HEP  -KM     STG 1 Progress Met  -KM     STG 2 Tolerate 45 minute treatment session without increased pain  -KM     STG 2 Progress Met  -KM     STG 3 Ambulate independently with no AD, non-antalgic gait, good heel strike noted  -KM     STG 3 Progress Met  -KM     STG 4 Maintain knee AROM WNL without increased pain  -KM     STG 4 Progress Met  -KM     STG 5 Perform R SLS on level surface for 15\" or greater  -KM     STG 5 Progress Goal Revised  -KM        Long Term Goals    LTG Date to Achieve 07/24/18  -KM     LTG 1 Subjectively report 60% improvement or " greater  -KM     LTG 1 Progress Met  -KM     LTG 2 Improve LEFS score to 40/80 or greater  -KM     LTG 2 Progress Met  -KM     LTG 3 Improve R hip flex/abd MMT to 4+/5 or greater  -KM     LTG 3 Progress Met  -KM     LTG 4 Demonstrate R knee flex/ext MMT to 5/5  -KM     LTG 4 Progress Partially Met;Progressing  -KM     LTG 5 Ascend/descend 3 steps x 2, reciprocal pattern, no increased pain/compensation  -KM     LTG 5 Progress Progressing  -KM     LTG 6 Independent in aquatics/fitness & final HEP  -KM     LTG 6 Progress Progressing  -KM     LTG 7 Subjectively report improved TTP at medial hamstring by 75%  -KM     LTG 7 Progress Progressing  -KM        Time Calculation    PT Goal Re-Cert Due Date 07/24/18  -KM       User Key  (r) = Recorded By, (t) = Taken By, (c) = Cosigned By    Initials Name Provider Type     Suzy Caruso PTA Physical Therapy Assistant          Therapy Education  Given: HEP, Symptoms/condition management, Pain management  Program: Reinforced  How Provided: Verbal  Provided to: Patient  Level of Understanding: Verbalized, Demonstrated              Time Calculation:   Start Time: 1515  Stop Time: 1600  Time Calculation (min): 45 min  Total Timed Code Minutes- PT: 45 minute(s)  Therapy Suggested Charges     Code   Minutes Charges    None           Therapy Charges for Today     Code Description Service Date Service Provider Modifiers Qty    24261246945 HC PT THER PROC EA 15 MIN 7/10/2018 Suzy Caruso PTA GP 2    79970816792 HC PT MANUAL THERAPY EA 15 MIN 7/10/2018 Suzy Caurso PTA GP 1                    Suzy Caruso PTA  7/10/2018

## 2018-07-12 ENCOUNTER — HOSPITAL ENCOUNTER (OUTPATIENT)
Dept: PHYSICAL THERAPY | Facility: HOSPITAL | Age: 63
Setting detail: THERAPIES SERIES
Discharge: HOME | End: 2018-07-12

## 2018-07-12 DIAGNOSIS — M25.561 RIGHT KNEE PAIN, UNSPECIFIED CHRONICITY: Primary | ICD-10-CM

## 2018-07-12 PROCEDURE — 97113 AQUATIC THERAPY/EXERCISES: CPT | Performed by: PHYSICAL THERAPIST

## 2018-07-12 NOTE — THERAPY TREATMENT NOTE
"    Outpatient Physical Therapy Ortho Treatment Note  [facility]     Patient Name: Tabitha Davila  : 1955  MRN: [id number]  Today's Date: 2018      Visit Date: 2018    Subjective Improvement: 90%      Attendance: 10/10  Approved: 20 per year          MD follow up: PRN           date: 18         Visit Dx:    ICD-10-CM ICD-9-CM   1. Right knee pain, unspecified chronicity M25.561 719.46       There is no problem list on file for this patient.       No past medical history on file.     Past Surgical History:   Procedure Laterality Date   •  SECTION     • CHOLECYSTECTOMY     • DILATION AND CURETTAGE, DIAGNOSTIC / THERAPEUTIC     • TONSILLECTOMY               PT Ortho     Row Name 18 1600       Subjective Comments    Subjective Comments Pt states her knee is sore again today. Most of her pain is still at medial knee/hamstring area.  -KG       Precautions and Contraindications    Precautions/Limitations no known precautions/limitations  -KG       Subjective Pain    Post-Treatment Pain Level 2  -KG       Posture/Observations    Posture/Observations Comments No acute distress. Ambulates with no AD, non-antalgic gait.  -KG    Row Name 07/10/18 1500       Subjective Comments    Subjective Comments pt states that she has had company and has been on the go for a couple of days and knee is sore  -KM       Precautions and Contraindications    Precautions/Limitations no known precautions/limitations  -KM       Subjective Pain    Able to rate subjective pain? yes  -KM    Pre-Treatment Pain Level --   \"sore\"  -KM    Post-Treatment Pain Level --   \"better\"  -KM       Posture/Observations    Posture/Observations Comments No acute distress. Ambulates with no AD, non-antalgic gait present. Slight decreased stance time RLE noted post treatment but very minor. Continues to have TTP/tightness at medial hamstring.  -KM      User Key  (r) = Recorded By, (t) = Taken By, (c) = Cosigned " By    Initials Name Provider Type    KG Zayda Barlow, PT Physical Therapist    KM Suzy Muhammad, ELLIS Physical Therapy Assistant                            PT Assessment/Plan     Row Name 07/12/18 1600          PT Assessment    Functional Limitations Decreased safety during functional activities;Impaired gait;Limitation in home management;Limitations in community activities;Limitations in functional capacity and performance;Performance in leisure activities;Performance in work activities  -KG     Impairments Balance;Gait;Edema;Impaired flexibility;Impaired muscle endurance;Muscle strength;Pain;Range of motion  -KG     Assessment Comments Pt did well with aquatics this date. Improved tolerance to lat step ups but still painful. Overall good performance throughout with only minimal cues needed.  -KG     Rehab Potential Good  -KG     Patient/caregiver participated in establishment of treatment plan and goals Yes  -KG     Patient would benefit from skilled therapy intervention Yes  -KG        PT Plan    PT Frequency 2x/week  -KG     Predicted Duration of Therapy Intervention (Therapy Eval) 1-2 more weeks  -KG     PT Plan Comments Possible d/c next week. Continue manual on land.  -KG       User Key  (r) = Recorded By, (t) = Taken By, (c) = Cosigned By    Initials Name Provider Type    KG Zayda Barlow, PT Physical Therapist                    Exercises     Row Name 07/12/18 1600             Subjective Comments    Subjective Comments Pt states her knee is sore again today. Most of her pain is still at medial knee/hamstring area.  -KG         Subjective Pain    Able to rate subjective pain? yes  -KG      Pre-Treatment Pain Level 2  -KG      Post-Treatment Pain Level 2  -KG         Aquatics    Aquatics performed? Yes  -KG         Aquatics LE    Water Walk forward;side;backward;Other (comment)   F/B 4', lateral 4', march walk 3'; added LC to fwd/bwd  -KG      Vertical Traction 2 min relax  -KG      Hip Abd/Add  "2x10; bilateral   small RF hydro resist  -KG      Hip Flex/Ext 2x10; bilateral   sm RF hydroresist  -KG      Mini Squat 25  -KG      Toe/Heel Raises 25  -KG      Step Ups 2x10   FF; lat step up x 15 reps  -KG      Bicycle 3 min deep water  -KG      Flutter/Scissor 2 min deep water flutte kick; discomfort with scissor kick  -KG         Exercise 1    Exercise Name 1 AQ: Float step march with BRF  -KG      Sets 1 2  -KG      Reps 1 10  -KG         Exercise 2    Exercise Name 2 AQ: Hamstring curl  -KG      Sets 2 2  -KG      Reps 2 10  -KG         Exercise 3    Exercise Name 3 --  -KG      Cueing 3 --  -KG      Sets 3 --  -KG      Reps 3 --  -KG        User Key  (r) = Recorded By, (t) = Taken By, (c) = Cosigned By    Initials Name Provider Type    KG Zayda Barlow, PT Physical Therapist                               PT OP Goals     Row Name 07/12/18 1600          PT Short Term Goals    STG Date to Achieve 07/10/18  -KG     STG 1 Independent/compliant with HEP  -KG     STG 1 Progress Met  -KG     STG 2 Tolerate 45 minute treatment session without increased pain  -KG     STG 2 Progress Met  -KG     STG 3 Ambulate independently with no AD, non-antalgic gait, good heel strike noted  -KG     STG 3 Progress Met  -KG     STG 4 Maintain knee AROM WNL without increased pain  -KG     STG 4 Progress Met  -KG     STG 5 Perform R SLS on level surface for 15\" or greater  -KG     STG 5 Progress Goal Revised  -KG        Long Term Goals    LTG Date to Achieve 07/24/18  -KG     LTG 1 Subjectively report 60% improvement or greater  -KG     LTG 1 Progress Met  -KG     LTG 2 Improve LEFS score to 40/80 or greater  -KG     LTG 2 Progress Met  -KG     LTG 3 Improve R hip flex/abd MMT to 4+/5 or greater  -KG     LTG 3 Progress Met  -KG     LTG 4 Demonstrate R knee flex/ext MMT to 5/5  -KG     LTG 4 Progress Partially Met;Progressing  -KG     LTG 5 Ascend/descend 3 steps x 2, reciprocal pattern, no increased pain/compensation  -KG     LTG 5 " Progress Progressing  -KG     LTG 6 Independent in aquatics/fitness & final HEP  -KG     LTG 6 Progress Progressing  -KG     LTG 7 Subjectively report improved TTP at medial hamstring by 75%  -KG     LTG 7 Progress Progressing  -KG        Time Calculation    PT Goal Re-Cert Due Date 07/24/18  -KG       User Key  (r) = Recorded By, (t) = Taken By, (c) = Cosigned By    Initials Name Provider Type    KG Zayda Barlow, PT Physical Therapist          Therapy Education  Given: HEP, Symptoms/condition management, Pain management  Program: Reinforced  How Provided: Verbal  Provided to: Patient  Level of Understanding: Verbalized, Demonstrated              Time Calculation:   Start Time: 1602  Stop Time: 1649  Time Calculation (min): 47 min  Total Timed Code Minutes- PT: 47 minute(s)  Therapy Suggested Charges     Code   Minutes Charges    None           Therapy Charges for Today     Code Description Service Date Service Provider Modifiers Qty    76777925757 HC PT AQUATIC THERAPY EA 15 MIN 7/12/2018 Zayda Barlow, PT GP 3                    Zayda Barlow, PT  7/12/2018

## 2018-07-17 ENCOUNTER — HOSPITAL ENCOUNTER (OUTPATIENT)
Dept: PHYSICAL THERAPY | Facility: HOSPITAL | Age: 63
Setting detail: THERAPIES SERIES
Discharge: HOME | End: 2018-07-17

## 2018-07-17 DIAGNOSIS — M25.561 RIGHT KNEE PAIN, UNSPECIFIED CHRONICITY: Primary | ICD-10-CM

## 2018-07-17 PROCEDURE — 97140 MANUAL THERAPY 1/> REGIONS: CPT

## 2018-07-17 PROCEDURE — 97110 THERAPEUTIC EXERCISES: CPT

## 2018-07-17 NOTE — THERAPY TREATMENT NOTE
Outpatient Physical Therapy Ortho Treatment Note  [facility]  Suzy Muhammad PTA  18  4:53 PM       Patient Name: Tabitha Davila  : 1955  MRN: [id number]  Today's Date: 2018      Visit Date: 2018    Subjective Improvement: 90%      Attendance:    Approved: 20 per year           MD follow up: PRN           RC date: 18         Visit Dx:    ICD-10-CM ICD-9-CM   1. Right knee pain, unspecified chronicity M25.561 719.46       There is no problem list on file for this patient.       No past medical history on file.     Past Surgical History:   Procedure Laterality Date   •  SECTION     • CHOLECYSTECTOMY     • DILATION AND CURETTAGE, DIAGNOSTIC / THERAPEUTIC     • TONSILLECTOMY               PT Ortho     Row Name 18 1500       Subjective Comments    Subjective Comments pt states that she still is having trouble going up the steps but things have definatly gotten better  -KM       Subjective Pain    Able to rate subjective pain? yes  -KM       Posture/Observations    Posture/Observations Comments No acute distress. Ambulates with no AD, non-antalgic gait.  -KM      User Key  (r) = Recorded By, (t) = Taken By, (c) = Cosigned By    Initials Name Provider Type     Suzy Muhammad PTA Physical Therapy Assistant                            PT Assessment/Plan     Row Name 18 1500          PT Assessment    Functional Limitations Decreased safety during functional activities;Impaired gait;Limitation in home management;Limitations in community activities;Limitations in functional capacity and performance;Performance in leisure activities;Performance in work activities  -KM     Impairments Balance;Gait;Edema;Impaired flexibility;Impaired muscle endurance;Muscle strength;Pain;Range of motion  -KM     Assessment Comments pt still challenged with ecc control. pt complained of pain in HS and on both medial and lateral sides of patella. pt does  report improvement and beleives that she can continue therapy on her own and be D/C this week.   -KM     Rehab Potential Good  -KM     Patient/caregiver participated in establishment of treatment plan and goals Yes  -KM     Patient would benefit from skilled therapy intervention Yes  -KM        PT Plan    PT Frequency 2x/week  -KM     Predicted Duration of Therapy Intervention (Therapy Eval) 1-2 more weeks  -KM     PT Plan Comments Introduce cybex and educate on setup. Finalize HEP and D/C next visit to I management and free 30 day fitness  -KM       User Key  (r) = Recorded By, (t) = Taken By, (c) = Cosigned By    Initials Name Provider Type     Suzy Muhammad, PTA Physical Therapy Assistant                    Exercises     Row Name 07/17/18 1500             Subjective Comments    Subjective Comments pt states that she still is having trouble going up the steps but things have definatly gotten better  -KM         Subjective Pain    Able to rate subjective pain? yes  -KM      Pre-Treatment Pain Level 2  -KM      Post-Treatment Pain Level 2  -KM         Aquatics    Aquatics performed? No  -KM         Exercise 1    Exercise Name 1 PRO II for ROM  -KM      Time 1 10 min  -KM         Exercise 2    Exercise Name 2 Incline S  -KM      Reps 2 2  -KM      Time 2 30 sec hold  -KM         Exercise 3    Exercise Name 3 HS S  -KM      Reps 3 2  -KM      Time 3 30 sec hold  -KM         Exercise 4    Exercise Name 4 Aierx CR/TR  -KM      Sets 4 2  -KM      Reps 4 10  -KM         Exercise 5    Exercise Name 5 Airex mini squats  -KM      Sets 5 2  -KM      Reps 5 10  -KM         Exercise 6    Exercise Name 6 Airex march no UE support  -KM      Sets 6 2  -KM      Reps 6 10  -KM         Exercise 7    Exercise Name 7 BOSU fwd step up and over  -KM      Sets 7 2  -KM      Reps 7 10  -KM         Exercise 8    Exercise Name 8 BOSU lat step up and over  -KM      Sets 8 2  -KM      Reps 8 10  -KM         Exercise 9    Exercise  "Name 9 Sit to stands  -KM      Sets 9 2  -KM      Reps 9 10  -KM      Additional Comments no UE assist  -KM         Exercise 10    Exercise Name 10 Tandem R/L lead   -KM      Sets 10 --  -KM      Reps 10 2  -KM      Time 10 30 sec each  -KM         Exercise 11    Exercise Name 11 SLS on RLE  -KM      Sets 11 --  -KM      Reps 11 --  -KM      Time 11 10 sec best trial  -KM         Exercise 12    Exercise Name 12 Recip stairs  -KM      Reps 12 8  -KM      Time 12 --  -KM        User Key  (r) = Recorded By, (t) = Taken By, (c) = Cosigned By    Initials Name Provider Type    BLAYNE Muhammad PTA Physical Therapy Assistant                        Manual Rx (last 36 hours)      Manual Treatments     Row Name 07/17/18 1500             Manual Rx 1    Manual Rx 1 Location R medial hamstring and pes ans  -KM      Manual Rx 1 Type STM/MFR  -KM      Manual Rx 1 Duration 8 min  -KM        User Key  (r) = Recorded By, (t) = Taken By, (c) = Cosigned By    Initials Name Provider Type    BLAYNE Muhammad PTA Physical Therapy Assistant                PT OP Goals     Row Name 07/17/18 1600 07/17/18 1500       PT Short Term Goals    STG Date to Achieve  -- 07/10/18  -KM    STG 1  -- Independent/compliant with HEP  -KM    STG 1 Progress  -- Met  -KM    STG 2  -- Tolerate 45 minute treatment session without increased pain  -KM    STG 2 Progress  -- Met  -KM    STG 3  -- Ambulate independently with no AD, non-antalgic gait, good heel strike noted  -KM    STG 3 Progress  -- Met  -KM    STG 4  -- Maintain knee AROM WNL without increased pain  -KM    STG 4 Progress  -- Met  -KM    STG 5  -- Perform R SLS on level surface for 15\" or greater  -KM    STG 5 Progress  -- Goal Revised  -KM       Long Term Goals    LTG Date to Achieve  -- 07/24/18  -KM    LTG 1  -- Subjectively report 60% improvement or greater  -KM    LTG 1 Progress  -- Met  -KM    LTG 2  -- Improve LEFS score to 40/80 or greater  -KM    LTG 2 Progress  -- Met  " -KM    LTG 3  -- Improve R hip flex/abd MMT to 4+/5 or greater  -KM    LTG 3 Progress  -- Met  -KM    LTG 4  -- Demonstrate R knee flex/ext MMT to 5/5  -KM    LTG 4 Progress  -- Partially Met;Progressing  -KM    LTG 5  -- Ascend/descend 3 steps x 2, reciprocal pattern, no increased pain/compensation  -KM    LTG 5 Progress  -- Progressing  -KM    LTG 6  -- Independent in aquatics/fitness & final HEP  -KM    LTG 6 Progress  -- Progressing  -KM    LTG 7  -- Subjectively report improved TTP at medial hamstring by 75%  -KM    LTG 7 Progress  -- Progressing  -KM       Time Calculation    PT Goal Re-Cert Due Date 07/24/18  -KM  --      User Key  (r) = Recorded By, (t) = Taken By, (c) = Cosigned By    Initials Name Provider Type     Suzy Caruso PTA Physical Therapy Assistant          Therapy Education  Given: HEP, Symptoms/condition management, Pain management  Program: Reinforced  How Provided: Verbal  Provided to: Patient  Level of Understanding: Verbalized, Demonstrated              Time Calculation:   Start Time: 1515  Stop Time: 1600  Time Calculation (min): 45 min  Total Timed Code Minutes- PT: 45 minute(s)  Therapy Suggested Charges     Code   Minutes Charges    None           Therapy Charges for Today     Code Description Service Date Service Provider Modifiers Qty    39261662316 HC PT THER PROC EA 15 MIN 7/17/2018 Suzy Caruso PTA GP 2    10434111216 HC PT MANUAL THERAPY EA 15 MIN 7/17/2018 Suzy Caruso PTA GP 1                    Suzy Caruso PTA  7/17/2018

## 2018-09-19 ENCOUNTER — OFFICE VISIT (OUTPATIENT)
Dept: OBGYN | Age: 63
End: 2018-09-19
Payer: MEDICARE

## 2018-09-19 ENCOUNTER — HOSPITAL ENCOUNTER (OUTPATIENT)
Dept: WOMENS IMAGING | Age: 63
Discharge: HOME | End: 2018-09-19
Payer: COMMERCIAL

## 2018-09-19 ENCOUNTER — HOSPITAL ENCOUNTER (OUTPATIENT)
Dept: ULTRASOUND IMAGING | Age: 63
Discharge: HOME | End: 2018-09-19
Payer: COMMERCIAL

## 2018-09-19 VITALS
DIASTOLIC BLOOD PRESSURE: 74 MMHG | TEMPERATURE: 99.1 F | HEART RATE: 85 BPM | SYSTOLIC BLOOD PRESSURE: 114 MMHG | HEIGHT: 65 IN | BODY MASS INDEX: 34.32 KG/M2 | WEIGHT: 206 LBS

## 2018-09-19 DIAGNOSIS — R92.8 ABNORMAL MAMMOGRAM OF LEFT BREAST: ICD-10-CM

## 2018-09-19 DIAGNOSIS — Z01.419 WELL FEMALE EXAM WITH ROUTINE GYNECOLOGICAL EXAM: Primary | ICD-10-CM

## 2018-09-19 DIAGNOSIS — Z78.0 POSTMENOPAUSAL: ICD-10-CM

## 2018-09-19 DIAGNOSIS — Z12.4 SCREENING FOR CERVICAL CANCER: ICD-10-CM

## 2018-09-19 DIAGNOSIS — Z12.31 ENCOUNTER FOR SCREENING MAMMOGRAM FOR BREAST CANCER: ICD-10-CM

## 2018-09-19 DIAGNOSIS — Z11.51 SCREENING FOR HPV (HUMAN PAPILLOMAVIRUS): ICD-10-CM

## 2018-09-19 PROCEDURE — 77063 BREAST TOMOSYNTHESIS BI: CPT

## 2018-09-19 PROCEDURE — 76642 ULTRASOUND BREAST LIMITED: CPT

## 2018-09-19 PROCEDURE — G0101 CA SCREEN;PELVIC/BREAST EXAM: HCPCS | Performed by: OBSTETRICS & GYNECOLOGY

## 2018-09-19 RX ORDER — ASCORBIC ACID 500 MG
500 TABLET ORAL DAILY
COMMUNITY

## 2018-09-19 RX ORDER — LISINOPRIL 20 MG/1
20 TABLET ORAL DAILY
COMMUNITY

## 2018-09-19 ASSESSMENT — ENCOUNTER SYMPTOMS
RESPIRATORY NEGATIVE: 1
GASTROINTESTINAL NEGATIVE: 1
EYES NEGATIVE: 1

## 2018-09-19 NOTE — PATIENT INSTRUCTIONS
Patient Education        Well Visit, Women 48 to 72: Care Instructions  Your Care Instructions    Physical exams can help you stay healthy. Your doctor has checked your overall health and may have suggested ways to take good care of yourself. He or she also may have recommended tests. At home, you can help prevent illness with healthy eating, regular exercise, and other steps. Follow-up care is a key part of your treatment and safety. Be sure to make and go to all appointments, and call your doctor if you are having problems. It's also a good idea to know your test results and keep a list of the medicines you take. How can you care for yourself at home? · Reach and stay at a healthy weight. This will lower your risk for many problems, such as obesity, diabetes, heart disease, and high blood pressure. · Get at least 30 minutes of exercise on most days of the week. Walking is a good choice. You also may want to do other activities, such as running, swimming, cycling, or playing tennis or team sports. · Do not smoke. Smoking can make health problems worse. If you need help quitting, talk to your doctor about stop-smoking programs and medicines. These can increase your chances of quitting for good. · Protect your skin from too much sun. When you're outdoors from 10 a.m. to 4 p.m., stay in the shade or cover up with clothing and a hat with a wide brim. Wear sunglasses that block UV rays. Even when it's cloudy, put broad-spectrum sunscreen (SPF 30 or higher) on any exposed skin. · See a dentist one or two times a year for checkups and to have your teeth cleaned. · Wear a seat belt in the car. · Limit alcohol to 1 drink a day. Too much alcohol can cause health problems. Follow your doctor's advice about when to have certain tests. These tests can spot problems early. · Cholesterol.  Your doctor will tell you how often to have this done based on your age, family history, or other things that can increase your risk

## 2018-09-19 NOTE — PROGRESS NOTES
Subjective:      Patient ID: Dirk Castanon is a 70 y.o. female. HPI  Pt is here today for her annual exam. Pt is doing well. Dirk Castanon is a 70 y.o. female with the following history as recorded in [facility]: There are no active problems to display for this patient. Current Outpatient Prescriptions   Medication Sig Dispense Refill    vitamin C (ASCORBIC ACID) 500 MG tablet Take 500 mg by mouth daily      lisinopril (PRINIVIL;ZESTRIL) 20 MG tablet Take 20 mg by mouth daily      Misc Natural Products (LECI-THIN DIET AID PO) Take by mouth      Ginkgo Biloba (GNP GINGKO BILOBA EXTRACT PO) Take by mouth      GARLIC 8172 PO Take  by mouth.  vitamin E 1000 UNITS capsule Take 1,000 Units by mouth daily.  vitamin A 57760 UNITS capsule Take 10,000 Units by mouth daily.  Multiple Vitamin (MULTI-VITAMIN DAILY PO) Take  by mouth.  Zoe, Zingiber officinalis, (ZOE PO) Take  by mouth.  Nutritional Supplements (VITAMIN D BOOSTER PO) Take  by mouth. No current facility-administered medications for this visit. Allergies: Streptomycin  Past Medical History:   Diagnosis Date    High cholesterol     History of blood transfusion     Hypertension     Osteopenia     Pityriasis rosea     Skipped heart beats      Past Surgical History:   Procedure Laterality Date    BREAST CYST ASPIRATION       SECTION  1979    CHOLECYSTECTOMY      DILATION AND CURETTAGE OF UTERUS      TONSILLECTOMY  1991     Family History   Problem Relation Age of Onset    High Blood Pressure Father         Passed away at [id number].  Breast Cancer Mother 50    Uterine Cancer Sister 61        adenocarcinoma,edometrial    Stroke Paternal Grandfather      Social History   Substance Use Topics    Smoking status: Never Smoker    Smokeless tobacco: Never Used    Alcohol use Yes      Comment: occassional       Review of Systems   Constitutional: Negative. HENT: Negative.     Eyes: and dry. Psychiatric: She has a normal mood and affect. Her behavior is normal.       Assessment:      1. Well female exam with routine gynecological exam    2. Screening for cervical cancer    3. Screening for HPV (human papillomavirus)    4. Abnormal mammogram of left breast    5. Postmenopausal            Plan:       MEDICATIONS:  No orders of the defined types were placed in this encounter. ORDERS:  Orders Placed This Encounter   Procedures    US BREAST COMPLETE LEFT    DEXA BONE DENSITY AXIAL SKELETON    PAP SMEAR    Human papillomavirus (HPV) DNA Probe Thin Prep High Risk       Reviewed today's mammo results with pt and will have left breast u/s done today. DEXA ordered and pt is to [facility]  Pap obtained  F/u in 1 yr or prn  All questions answered.          Berkley Oppenheim, MD

## 2018-09-20 ENCOUNTER — TELEPHONE (OUTPATIENT)
Dept: OBGYN | Age: 63
End: 2018-09-20

## 2018-09-20 NOTE — TELEPHONE ENCOUNTER
Called and informed pt of breast u/s results and recommendations for bx. Pt is wanting a second opinion and with discuss results with PCP and the next step. Pt is requesting results be sent to her PCP, Rivera Duffy at [phone]. Will fax results.

## 2018-09-24 LAB
HPV TYPE 16: NOT DETECTED
HPV TYPE 18: NOT DETECTED
INTERPRETATION: NORMAL
OTHER HIGH RISK HPV: NOT DETECTED
SOURCE: NORMAL

## 2019-06-14 ENCOUNTER — HOSPITAL ENCOUNTER (OUTPATIENT)
Dept: ULTRASOUND IMAGING | Facility: HOSPITAL | Age: 64
End: 2019-06-14

## 2019-06-14 ENCOUNTER — TRANSCRIBE ORDERS (OUTPATIENT)
Dept: ADMINISTRATIVE | Facility: HOSPITAL | Age: 64
End: 2019-06-14

## 2019-06-14 DIAGNOSIS — R10.2 PELVIC PAIN IN FEMALE: Primary | ICD-10-CM

## 2019-06-14 DIAGNOSIS — R31.9 HEMATURIA, UNSPECIFIED TYPE: ICD-10-CM

## 2019-06-18 ENCOUNTER — HOSPITAL ENCOUNTER (OUTPATIENT)
Dept: ULTRASOUND IMAGING | Facility: HOSPITAL | Age: 64
Discharge: HOME | End: 2019-06-18
Admitting: FAMILY MEDICINE

## 2019-06-18 PROCEDURE — 76856 US EXAM PELVIC COMPLETE: CPT

## 2019-06-24 ENCOUNTER — TRANSCRIBE ORDERS (OUTPATIENT)
Dept: ADMINISTRATIVE | Facility: HOSPITAL | Age: 64
End: 2019-06-24

## 2019-06-24 DIAGNOSIS — R10.2 SUPRAPUBIC PAIN: Primary | ICD-10-CM

## 2019-06-24 DIAGNOSIS — R31.9 HEMATURIA, UNSPECIFIED TYPE: ICD-10-CM

## 2019-06-24 DIAGNOSIS — R10.30 LOWER ABDOMINAL PAIN: ICD-10-CM

## 2019-07-01 ENCOUNTER — HOSPITAL ENCOUNTER (OUTPATIENT)
Dept: CT IMAGING | Facility: HOSPITAL | Age: 64
Discharge: HOME | End: 2019-07-01
Admitting: FAMILY MEDICINE

## 2019-07-01 DIAGNOSIS — R10.2 SUPRAPUBIC PAIN: ICD-10-CM

## 2019-07-01 DIAGNOSIS — R10.30 LOWER ABDOMINAL PAIN: ICD-10-CM

## 2019-07-01 DIAGNOSIS — R31.9 HEMATURIA, UNSPECIFIED TYPE: ICD-10-CM

## 2019-07-01 LAB — CREAT BLDA-MCNC: 0.8 MG/DL (ref 0.6–1.3)

## 2019-07-01 PROCEDURE — 25010000002 IOPAMIDOL 61 % SOLUTION: Performed by: FAMILY MEDICINE

## 2019-07-01 PROCEDURE — 82565 ASSAY OF CREATININE: CPT

## 2019-07-01 PROCEDURE — 74178 CT ABD&PLV WO CNTR FLWD CNTR: CPT

## 2019-07-01 RX ADMIN — IOPAMIDOL 100 ML: 612 INJECTION, SOLUTION INTRAVENOUS at 09:22

## 2019-07-03 ENCOUNTER — TRANSCRIBE ORDERS (OUTPATIENT)
Dept: ADMINISTRATIVE | Facility: HOSPITAL | Age: 64
End: 2019-07-03

## 2019-07-03 DIAGNOSIS — R93.5 ABNORMAL CT SCAN, PELVIS: Primary | ICD-10-CM

## 2019-08-21 ENCOUNTER — HOSPITAL ENCOUNTER (OUTPATIENT)
Dept: ULTRASOUND IMAGING | Facility: HOSPITAL | Age: 64
Discharge: HOME | End: 2019-08-21
Admitting: FAMILY MEDICINE

## 2019-08-21 DIAGNOSIS — R93.5 ABNORMAL CT SCAN, PELVIS: ICD-10-CM

## 2019-08-21 PROCEDURE — 76830 TRANSVAGINAL US NON-OB: CPT

## 2020-01-27 ENCOUNTER — HOSPITAL ENCOUNTER (OUTPATIENT)
Dept: GENERAL RADIOLOGY | Facility: HOSPITAL | Age: 65
Discharge: HOME | End: 2020-01-27
Admitting: FAMILY MEDICINE

## 2020-01-27 ENCOUNTER — TRANSCRIBE ORDERS (OUTPATIENT)
Dept: ADMINISTRATIVE | Facility: HOSPITAL | Age: 65
End: 2020-01-27

## 2020-01-27 DIAGNOSIS — M54.50 BACK PAIN, LUMBOSACRAL: Primary | ICD-10-CM

## 2020-01-27 DIAGNOSIS — M54.50 BACK PAIN, LUMBOSACRAL: ICD-10-CM

## 2020-01-27 PROCEDURE — 72110 X-RAY EXAM L-2 SPINE 4/>VWS: CPT

## 2020-11-16 ENCOUNTER — TRANSCRIBE ORDERS (OUTPATIENT)
Dept: ADMINISTRATIVE | Facility: HOSPITAL | Age: 65
End: 2020-11-16

## 2020-11-16 ENCOUNTER — HOSPITAL ENCOUNTER (OUTPATIENT)
Dept: GENERAL RADIOLOGY | Facility: HOSPITAL | Age: 65
Discharge: HOME | End: 2020-11-16
Admitting: FAMILY MEDICINE

## 2020-11-16 DIAGNOSIS — M54.42 LOW BACK PAIN WITH LEFT-SIDED SCIATICA, UNSPECIFIED BACK PAIN LATERALITY, UNSPECIFIED CHRONICITY: ICD-10-CM

## 2020-11-16 DIAGNOSIS — M54.42 LOW BACK PAIN WITH LEFT-SIDED SCIATICA, UNSPECIFIED BACK PAIN LATERALITY, UNSPECIFIED CHRONICITY: Primary | ICD-10-CM

## 2020-11-16 PROCEDURE — 72110 X-RAY EXAM L-2 SPINE 4/>VWS: CPT

## 2021-06-15 ENCOUNTER — TRANSCRIBE ORDERS (OUTPATIENT)
Dept: ADMINISTRATIVE | Facility: HOSPITAL | Age: 66
End: 2021-06-15

## 2021-06-15 DIAGNOSIS — R10.9 ABDOMINAL PAIN, UNSPECIFIED ABDOMINAL LOCATION: Primary | ICD-10-CM

## 2021-06-28 ENCOUNTER — HOSPITAL ENCOUNTER (OUTPATIENT)
Dept: ULTRASOUND IMAGING | Facility: HOSPITAL | Age: 66
Discharge: HOME | End: 2021-06-28
Admitting: FAMILY MEDICINE

## 2021-06-28 DIAGNOSIS — R10.9 ABDOMINAL PAIN, UNSPECIFIED ABDOMINAL LOCATION: ICD-10-CM

## 2021-06-28 PROCEDURE — 76700 US EXAM ABDOM COMPLETE: CPT

## 2021-06-30 ENCOUNTER — TRANSCRIBE ORDERS (OUTPATIENT)
Dept: ADMINISTRATIVE | Facility: HOSPITAL | Age: 66
End: 2021-06-30

## 2021-07-01 ENCOUNTER — TRANSCRIBE ORDERS (OUTPATIENT)
Dept: ADMINISTRATIVE | Facility: HOSPITAL | Age: 66
End: 2021-07-01

## 2021-07-01 DIAGNOSIS — R93.5 ABNORMAL ABDOMINAL ULTRASOUND: Primary | ICD-10-CM

## 2021-07-02 ENCOUNTER — HOSPITAL ENCOUNTER (OUTPATIENT)
Dept: CT IMAGING | Facility: HOSPITAL | Age: 66
Discharge: HOME | End: 2021-07-02
Admitting: FAMILY MEDICINE

## 2021-07-02 DIAGNOSIS — R93.5 ABNORMAL ABDOMINAL ULTRASOUND: ICD-10-CM

## 2021-07-02 PROCEDURE — 74176 CT ABD & PELVIS W/O CONTRAST: CPT

## 2021-07-07 ENCOUNTER — TRANSCRIBE ORDERS (OUTPATIENT)
Dept: ADMINISTRATIVE | Facility: HOSPITAL | Age: 66
End: 2021-07-07

## 2021-07-07 DIAGNOSIS — R93.422 ABNORMAL FINDING ON DIAGNOSTIC IMAGING OF LEFT KIDNEY: ICD-10-CM

## 2021-07-07 DIAGNOSIS — R31.9 HEMATURIA, UNSPECIFIED TYPE: Primary | ICD-10-CM

## 2021-07-16 ENCOUNTER — HOSPITAL ENCOUNTER (OUTPATIENT)
Dept: CT IMAGING | Facility: HOSPITAL | Age: 66
Discharge: HOME | End: 2021-07-16
Admitting: FAMILY MEDICINE

## 2021-07-16 DIAGNOSIS — R93.422 ABNORMAL FINDING ON DIAGNOSTIC IMAGING OF LEFT KIDNEY: ICD-10-CM

## 2021-07-16 DIAGNOSIS — R31.9 HEMATURIA, UNSPECIFIED TYPE: ICD-10-CM

## 2021-07-16 LAB — CREAT BLDA-MCNC: 0.8 MG/DL (ref 0.6–1.3)

## 2021-07-16 PROCEDURE — 82565 ASSAY OF CREATININE: CPT

## 2021-07-16 PROCEDURE — 25010000002 IOPAMIDOL 61 % SOLUTION: Performed by: FAMILY MEDICINE

## 2021-07-16 PROCEDURE — 74177 CT ABD & PELVIS W/CONTRAST: CPT

## 2021-07-16 RX ADMIN — IOPAMIDOL 100 ML: 612 INJECTION, SOLUTION INTRAVENOUS at 12:08

## 2023-02-17 ENCOUNTER — APPOINTMENT (OUTPATIENT)
Dept: GENERAL RADIOLOGY | Facility: HOSPITAL | Age: 68
End: 2023-02-17
Payer: MEDICARE

## 2023-02-17 ENCOUNTER — HOSPITAL ENCOUNTER (EMERGENCY)
Facility: HOSPITAL | Age: 68
Discharge: HOME | End: 2023-02-17
Attending: EMERGENCY MEDICINE | Admitting: EMERGENCY MEDICINE
Payer: MEDICARE

## 2023-02-17 VITALS
SYSTOLIC BLOOD PRESSURE: 132 MMHG | RESPIRATION RATE: 20 BRPM | DIASTOLIC BLOOD PRESSURE: 82 MMHG | TEMPERATURE: 97.9 F | HEART RATE: 82 BPM | OXYGEN SATURATION: 98 %

## 2023-02-17 DIAGNOSIS — R00.2 PALPITATIONS: Primary | ICD-10-CM

## 2023-02-17 DIAGNOSIS — R00.0 TACHYCARDIA: ICD-10-CM

## 2023-02-17 LAB
ALBUMIN SERPL-MCNC: 4.2 G/DL (ref 3.5–5.2)
ALBUMIN/GLOB SERPL: 1.8 G/DL
ALP SERPL-CCNC: 78 U/L (ref 39–117)
ALT SERPL W P-5'-P-CCNC: 15 U/L (ref 1–33)
ANION GAP SERPL CALCULATED.3IONS-SCNC: 12 MMOL/L (ref 5–15)
AST SERPL-CCNC: 19 U/L (ref 1–32)
BASOPHILS # BLD AUTO: 0.04 10*3/MM3 (ref 0–0.2)
BASOPHILS NFR BLD AUTO: 0.4 % (ref 0–1.5)
BILIRUB SERPL-MCNC: 0.3 MG/DL (ref 0–1.2)
BUN SERPL-MCNC: 12 MG/DL (ref 8–23)
BUN/CREAT SERPL: 15.4 (ref 7–25)
CALCIUM SPEC-SCNC: 9.1 MG/DL (ref 8.6–10.5)
CHLORIDE SERPL-SCNC: 101 MMOL/L (ref 98–107)
CO2 SERPL-SCNC: 26 MMOL/L (ref 22–29)
CREAT SERPL-MCNC: 0.78 MG/DL (ref 0.57–1)
D DIMER PPP FEU-MCNC: 0.34 MCGFEU/ML (ref 0–0.69)
DEPRECATED RDW RBC AUTO: 44.5 FL (ref 37–54)
EGFRCR SERPLBLD CKD-EPI 2021: 82.3 ML/MIN/1.73
EOSINOPHIL # BLD AUTO: 0.02 10*3/MM3 (ref 0–0.4)
EOSINOPHIL NFR BLD AUTO: 0.2 % (ref 0.3–6.2)
ERYTHROCYTE [DISTWIDTH] IN BLOOD BY AUTOMATED COUNT: 13.2 % (ref 12.3–15.4)
GEN 5 2HR TROPONIN T REFLEX: 7 NG/L
GLOBULIN UR ELPH-MCNC: 2.3 GM/DL
GLUCOSE SERPL-MCNC: 100 MG/DL (ref 65–99)
HCT VFR BLD AUTO: 43.4 % (ref 34–46.6)
HGB BLD-MCNC: 14.2 G/DL (ref 12–15.9)
IMM GRANULOCYTES # BLD AUTO: 0.03 10*3/MM3 (ref 0–0.05)
IMM GRANULOCYTES NFR BLD AUTO: 0.3 % (ref 0–0.5)
INR PPP: 0.93 (ref 0.91–1.09)
LYMPHOCYTES # BLD AUTO: 1.89 10*3/MM3 (ref 0.7–3.1)
LYMPHOCYTES NFR BLD AUTO: 19.2 % (ref 19.6–45.3)
MAGNESIUM SERPL-MCNC: 2 MG/DL (ref 1.6–2.4)
MCH RBC QN AUTO: 29.8 PG (ref 26.6–33)
MCHC RBC AUTO-ENTMCNC: 32.7 G/DL (ref 31.5–35.7)
MCV RBC AUTO: 91.2 FL (ref 79–97)
MONOCYTES # BLD AUTO: 0.7 10*3/MM3 (ref 0.1–0.9)
MONOCYTES NFR BLD AUTO: 7.1 % (ref 5–12)
NEUTROPHILS NFR BLD AUTO: 7.18 10*3/MM3 (ref 1.7–7)
NEUTROPHILS NFR BLD AUTO: 72.8 % (ref 42.7–76)
NRBC BLD AUTO-RTO: 0 /100 WBC (ref 0–0.2)
NT-PROBNP SERPL-MCNC: <36 PG/ML (ref 0–900)
PLATELET # BLD AUTO: 263 10*3/MM3 (ref 140–450)
PMV BLD AUTO: 11.1 FL (ref 6–12)
POTASSIUM SERPL-SCNC: 3.8 MMOL/L (ref 3.5–5.2)
PROT SERPL-MCNC: 6.5 G/DL (ref 6–8.5)
PROTHROMBIN TIME: 12.5 SECONDS (ref 11.8–14.8)
RBC # BLD AUTO: 4.76 10*6/MM3 (ref 3.77–5.28)
SODIUM SERPL-SCNC: 139 MMOL/L (ref 136–145)
T4 FREE SERPL-MCNC: 1.25 NG/DL (ref 0.93–1.7)
TROPONIN T DELTA: -3 NG/L
TROPONIN T SERPL HS-MCNC: 10 NG/L
TSH SERPL DL<=0.05 MIU/L-ACNC: 1.13 UIU/ML (ref 0.27–4.2)
WBC NRBC COR # BLD: 9.86 10*3/MM3 (ref 3.4–10.8)

## 2023-02-17 PROCEDURE — 83880 ASSAY OF NATRIURETIC PEPTIDE: CPT | Performed by: EMERGENCY MEDICINE

## 2023-02-17 PROCEDURE — 85025 COMPLETE CBC W/AUTO DIFF WBC: CPT | Performed by: EMERGENCY MEDICINE

## 2023-02-17 PROCEDURE — 84439 ASSAY OF FREE THYROXINE: CPT | Performed by: EMERGENCY MEDICINE

## 2023-02-17 PROCEDURE — 71045 X-RAY EXAM CHEST 1 VIEW: CPT

## 2023-02-17 PROCEDURE — 36415 COLL VENOUS BLD VENIPUNCTURE: CPT

## 2023-02-17 PROCEDURE — 93010 ELECTROCARDIOGRAM REPORT: CPT | Performed by: INTERNAL MEDICINE

## 2023-02-17 PROCEDURE — 99284 EMERGENCY DEPT VISIT MOD MDM: CPT

## 2023-02-17 PROCEDURE — 85610 PROTHROMBIN TIME: CPT | Performed by: EMERGENCY MEDICINE

## 2023-02-17 PROCEDURE — 84484 ASSAY OF TROPONIN QUANT: CPT | Performed by: EMERGENCY MEDICINE

## 2023-02-17 PROCEDURE — 85379 FIBRIN DEGRADATION QUANT: CPT | Performed by: EMERGENCY MEDICINE

## 2023-02-17 PROCEDURE — 83735 ASSAY OF MAGNESIUM: CPT | Performed by: EMERGENCY MEDICINE

## 2023-02-17 PROCEDURE — 93005 ELECTROCARDIOGRAM TRACING: CPT | Performed by: EMERGENCY MEDICINE

## 2023-02-17 PROCEDURE — 80053 COMPREHEN METABOLIC PANEL: CPT | Performed by: EMERGENCY MEDICINE

## 2023-02-17 PROCEDURE — 84443 ASSAY THYROID STIM HORMONE: CPT | Performed by: EMERGENCY MEDICINE

## 2023-02-17 NOTE — ED PROVIDER NOTES
"Subjective   History of Present Illness  Patient is a 67-year-old patient who came to the ER because of palpitations she developed COVID some while ago and has lost the sense of taste and smell she is seeing acupuncture allergist in [place] who inserted 3 needles in her ear and told her that she is allergic to wasp and bee stings alpha-gal and histamine therefore there are multiple foods that she cannot take she was also given 2 different sprays of some formulated compounded spray to use by mouth twice a day I am not sure what is the constituents of the sprays.  Patient has had been having episodes of palpitation which she attributed to be her allergies especially since she had one of the symptoms after taking CABG which she is not supposed to take she went to [facility] was seen in the ER lab work-up was done and was discharged home the next day she had another episode took epinephrine for it although she was not short of breath or dyspneic.  Went back to [facility] they told her that it could be the epinephrine that she took.  She was given some IV started feeling better and was being discharged at discharge the nurse told her that cannot admitted to the hospital because of fall if she has episodes again to go to [facility] she had another episode of \"\" palpitation no shortness of breath no chest pain no nausea no vomiting no rash and came to the ER for evaluation.    Patient states that he has episodes in which she feels warm in her throat and upper chest and the right side of her nose feels numb they are episodic and come and go.    Palpitations  Palpitations quality:  Regular  Onset quality:  Unable to specify  Timing:  Intermittent  Progression:  Waxing and waning  Chronicity:  Recurrent  Context: not anxiety, not appetite suppressants, not blood loss, not dehydration, not exercise, not illicit drugs and not nicotine    Relieved by:  Nothing  Worsened by:  Nothing (Food " allergy)  Ineffective treatments: Epinephrine.  Associated symptoms: no back pain, no chest pain, no chest pressure, no cough, no diaphoresis, no dizziness, no leg pain, no lower extremity edema, no malaise/fatigue, no nausea, no near-syncope, no numbness, no orthopnea, no PND, no shortness of breath, no syncope, no vomiting and no weakness    Risk factors: no diabetes mellitus, no heart disease, no hx of thyroid disease and no OTC sinus medications        Review of Systems   Constitutional: Negative.  Negative for diaphoresis and malaise/fatigue.   HENT: Negative.    Eyes: Negative.    Respiratory: Negative.  Negative for cough and shortness of breath.    Cardiovascular: Positive for palpitations. Negative for chest pain, orthopnea, syncope, PND and near-syncope.   Gastrointestinal: Negative.  Negative for nausea and vomiting.   Musculoskeletal: Negative.  Negative for back pain and neck pain.   Skin: Negative.    Neurological: Negative.  Negative for dizziness, weakness and numbness.   All other systems reviewed and are negative.      History reviewed. No pertinent past medical history.    Allergies   Allergen Reactions   • Wasp Venom Anaphylaxis   • Streptomycin Rash     Caused full body rash with itching as child       Past Surgical History:   Procedure Laterality Date   •  SECTION     • CHOLECYSTECTOMY     • DILATION AND CURETTAGE, DIAGNOSTIC / THERAPEUTIC     • TONSILLECTOMY         History reviewed. No pertinent family history.    Social History     Socioeconomic History   • Marital status:            Objective   Physical Exam  Vitals and nursing note reviewed. Exam conducted with a chaperone present.   Constitutional:       General: She is not in acute distress.     Appearance: Normal appearance. She is well-developed. She is not toxic-appearing or diaphoretic.   HENT:      Head: Normocephalic and atraumatic.      Right Ear: External ear normal.      Nose: Nose normal.      Mouth/Throat:       Mouth: Mucous membranes are moist.   Eyes:      Conjunctiva/sclera: Conjunctivae normal.      Pupils: Pupils are equal, round, and reactive to light.   Cardiovascular:      Rate and Rhythm: Normal rate and regular rhythm.      Chest Wall: PMI is not displaced.      Pulses: Normal pulses. No decreased pulses.      Heart sounds: Normal heart sounds. No murmur heard.   No systolic murmur is present.   No diastolic murmur is present.  Pulmonary:      Effort: Pulmonary effort is normal. No tachypnea, accessory muscle usage or respiratory distress.      Breath sounds: Normal breath sounds. No stridor. No decreased breath sounds, wheezing, rhonchi or rales.   Chest:      Chest wall: No tenderness or crepitus.   Abdominal:      General: Bowel sounds are normal. There is no distension.      Palpations: Abdomen is soft.      Tenderness: There is no abdominal tenderness.   Musculoskeletal:         General: No swelling or tenderness. Normal range of motion.      Cervical back: Normal range of motion and neck supple. No rigidity.      Right lower leg: No edema.      Left lower leg: No edema.      Comments: Lower extremity exam bilaterally is unremarkable.  There is no right or left calf tenderness .  There is no palpable venous cord.  No obvious difference in the size of the legs.  No pitting edema.  The dorsalis pedis and posterior tibial femoral and popliteal pulses are palpable and +2 bilaterally.  Homans sign is negative   Skin:     General: Skin is warm.      Capillary Refill: Capillary refill takes less than 2 seconds.      Coloration: Skin is not jaundiced.      Findings: No erythema or rash.   Neurological:      General: No focal deficit present.      Mental Status: She is alert and oriented to person, place, and time. Mental status is at baseline.      Cranial Nerves: No cranial nerve deficit.      Motor: No weakness.      Coordination: Coordination normal.      Deep Tendon Reflexes: Reflexes are normal and symmetric.  Reflexes normal.   Psychiatric:         Mood and Affect: Mood normal.         Procedures           ED Course  ED Course as of 02/17/23 1600   Fri Feb 17, 2023   1115 Normal sinus rhythm [TS]   1556 Patient EKG normal sinus rhythm  Delta troponin is -3  Heart score is 2  Years Algorithm for Pulmonary Embolus  To be used in hemodynamically stable patient >18 years old    No signs of DVT are present, there is no hemoptysis, PE is not the most likely diagnosis and the D-dimer is not greater or equal to 1000 ng/mL. Therefore PE is excluded . The Years algorithm rules out PE (0.43 % with symptomatic VTE during 3-month follow-up)  Wells 0  I have discussed this case with the patient the palpitations or tachycardia could be secondary to this.  She is using currently hemodynamic stable neurologically intact will discharge home with a follow-up with the primary MD she already has a Zio patch on. [TS]      ED Course User Index  [TS] Hu Herndon MD                                           Medical Decision Making  Patient with palpitations negative work-ups recently has had recent COVID    Palpitations: chronic illness or injury  Tachycardia: chronic illness or injury     Details: Patient with recurrent episodes of palpitations/tachycardia which may be getting triggered by the sprays that have been given by her allergist immunologist.  She is going to stop using those.  Her cardiac work-up over here looks negative and work-up essentially unremarkable.  I discussed this case at length with the patient she is got a Zio patch on she can follow the primary MD may require an echo at a later date.  Amount and/or Complexity of Data Reviewed  Labs: ordered.     Details: Lab work-up was negative  Radiology: ordered.     Details: Chest x-ray negative  ECG/medicine tests: ordered and independent interpretation performed.     Details: Normal sinus rhythm      Risk  Risk Details: Heart score 2  Well score 0  Years Algorithm for Pulmonary  Embolus  To be used in hemodynamically stable patient >18 years old    No signs of DVT are present, there is no hemoptysis, PE is not the most likely diagnosis and the D-dimer is not greater or equal to 1000 ng/mL. Therefore PE is excluded . The Years algorithm rules out PE (0.43 % with symptomatic VTE during 3-month follow-up)  Patient came in with palpitation and tachycardia.  There is low clinical suspicion of PE with negative well score and years score and a negative D-dimer.  Low suspicion of pneumonia with negative white cell count and negative chest x-ray.  Low suspicion for cardiac given the normal EKG and negative cardiac markers x2 low suspicion for anemia given the normal hemoglobin and no melena or hematochezia or hematemesis.  Low suspicion for renal failure with a normal chemistries.  Low suspicion for hyperthyroidism since the tachycardia is not persistent there is no weight loss there is no diarrhea there is no proptosis.  Low suspicion for sepsis with a negative work-up negative hemodynamics and no persistent tachycardia        Final diagnoses:   Palpitations   Tachycardia       ED Disposition  ED Disposition     ED Disposition   Discharge    Condition   Stable    Comment   --             Madison Davila MD  [address] [facility] [id number] [phone]    Schedule an appointment as soon as possible for a visit in 2 days           Medication List      No changes were made to your prescriptions during this visit.          Hu Herndon MD  02/17/23 1115       Hu Herndon MD  02/17/23 1600

## 2023-02-19 LAB
QT INTERVAL: 372 MS
QTC INTERVAL: 437 MS

## 2023-12-12 ENCOUNTER — TELEPHONE (OUTPATIENT)
Dept: NEUROSURGERY | Age: 68
End: 2023-12-12

## 2023-12-12 NOTE — TELEPHONE ENCOUNTER
1st attempt to contact patient.  I left a voicemail instructing patient to call back at [phone] to schedule their new patient appointment     Wedge compression fracture of second lumbar vertebra, initial encounter for closed fracture

## 2023-12-14 NOTE — TELEPHONE ENCOUNTER
[facility] Neurosurgery New Patient Questionnaire    Diagnosis/Reason for Referral?    DX: S32.020A (ICD-10-CM) - Wedge compression fracture of second lumbar vertebra, initial encouter for closed fracture     2. Who is completing questionnaire? Patient  X Caregiver Family      3. Has the patient had any previous spinal/brain surgeries? NO      A. If yes, what is the name of the facility in which the surgery was performed? B. Procedure/Surgery performed? C. Who was the surgeon? D. When was the surgery? MM/YY       E. Did the patient improve after the surgery? 4. Is this a second opinion? If yes, Dr. Bolivar Leon would like to review patient first before making the appointment. 5. Have MRI Images been obtain within the last year? Yes  No      XR X  CT X     If yes, where was the imaging performed? [facility]    If yes, what part of the body? Lumbar X Cervical  Thoracic  Brain     If yes, when was it obtained? 11/30/2023    Note: if the scan was performed at a facility other than [address] [place] [address], the disc will need to be brought to the appointment or we need to reach out to obtain the disc. A. Was the patient instructed to provide the disc? Yes X  No      8. Has the patient had a NCV/EMG within the last year? Yes  No X     If yes, where was it performed and date? MM/YY  Location:      9. Has the patient been to Physical Therapy? Yes  No X     If yes, what location, how long attended, and last visit? Location:        Therapy Lasted:    Date of Last Visit:      10. Has the patient been to Pain Management? Yes  No X     If yes, what location and last visit     Location:   Last Visit:   Is it helping?

## 2024-01-22 ENCOUNTER — HOSPITAL ENCOUNTER (OUTPATIENT)
Dept: GENERAL RADIOLOGY | Age: 69
Discharge: HOME | End: 2024-01-22
Payer: MEDICARE

## 2024-01-22 ENCOUNTER — OFFICE VISIT (OUTPATIENT)
Dept: NEUROSURGERY | Age: 69
End: 2024-01-22
Payer: MEDICARE

## 2024-01-22 VITALS
HEART RATE: 88 BPM | SYSTOLIC BLOOD PRESSURE: 122 MMHG | WEIGHT: 186.95 LBS | HEIGHT: 66 IN | DIASTOLIC BLOOD PRESSURE: 82 MMHG | BODY MASS INDEX: 30.05 KG/M2

## 2024-01-22 DIAGNOSIS — S32.020D COMPRESSION FRACTURE OF L2 LUMBAR VERTEBRA, WITH ROUTINE HEALING, SUBSEQUENT ENCOUNTER: Primary | ICD-10-CM

## 2024-01-22 DIAGNOSIS — S32.020A COMPRESSION FRACTURE OF L2 LUMBAR VERTEBRA, CLOSED, INITIAL ENCOUNTER (HCC): ICD-10-CM

## 2024-01-22 PROCEDURE — 1090F PRES/ABSN URINE INCON ASSESS: CPT | Performed by: NEUROLOGICAL SURGERY

## 2024-01-22 PROCEDURE — 72080 X-RAY EXAM THORACOLMB 2/> VW: CPT

## 2024-01-22 PROCEDURE — G8399 PT W/DXA RESULTS DOCUMENT: HCPCS | Performed by: NEUROLOGICAL SURGERY

## 2024-01-22 PROCEDURE — 1036F TOBACCO NON-USER: CPT | Performed by: NEUROLOGICAL SURGERY

## 2024-01-22 PROCEDURE — G8484 FLU IMMUNIZE NO ADMIN: HCPCS | Performed by: NEUROLOGICAL SURGERY

## 2024-01-22 PROCEDURE — G8417 CALC BMI ABV UP PARAM F/U: HCPCS | Performed by: NEUROLOGICAL SURGERY

## 2024-01-22 PROCEDURE — G8427 DOCREV CUR MEDS BY ELIG CLIN: HCPCS | Performed by: NEUROLOGICAL SURGERY

## 2024-01-22 PROCEDURE — 3017F COLORECTAL CA SCREEN DOC REV: CPT | Performed by: NEUROLOGICAL SURGERY

## 2024-01-22 PROCEDURE — 99213 OFFICE O/P EST LOW 20 MIN: CPT | Performed by: NEUROLOGICAL SURGERY

## 2024-01-22 PROCEDURE — 1123F ACP DISCUSS/DSCN MKR DOCD: CPT | Performed by: NEUROLOGICAL SURGERY

## 2024-01-22 RX ORDER — VITAMIN B COMPLEX
CAPSULE ORAL DAILY
COMMUNITY

## 2024-01-22 RX ORDER — CALCIUM CARBONATE 500(1250)
500 TABLET ORAL DAILY
COMMUNITY

## 2024-01-22 RX ORDER — CYANOCOBALAMIN (VITAMIN B-12) 500 MCG
TABLET ORAL
COMMUNITY

## 2024-01-22 ASSESSMENT — ENCOUNTER SYMPTOMS
EYES NEGATIVE: 1
RESPIRATORY NEGATIVE: 1
GASTROINTESTINAL NEGATIVE: 1

## 2024-01-22 NOTE — PROGRESS NOTES
NEUROSURGERY FOLLOW UP      Chief Complaint:   Chief Complaint   Patient presents with    Follow-up     Patient states that her back pain has improved since last visit.          Interval Update:    Patient returns for follow-up and updated x-rays.  She reports her back pain has improved and she has started trials of removing her brace.  She is not requiring any prescription medications for her back pain.  She continues to deny radicular pain or lower extremity numbness/weakness.      HPI:     The patient is a 68 y.o. female who presents for neurosurgical evaluation of an L2 compression fracture sustained after a fall from her porch on 11/30/2023.  She was initially evaluated at [facility] and referred to HUGO Cantor with Orthopedics in [place].  She was placed in a TLSO brace.  She continue to have significant back pain and an X-ray was repeated on 12/11/2023 that showed worsening of her fracture and she was referred to neurosurgery.  Since her recent x-rays were completed, her back pain has improved considerably.  She is no longer requiring pain medication or muscle relaxers.   She has chronic radicular pain in her left leg as well as chronic pain in her right hip, however she states these have not changed appreciably as a result of her fall.  She has undergone a DEXA scan in the past (2013) and was diagnosed with osteopenia.     ROS:    Constitutional: Negative.    HENT: Negative.     Eyes: Negative.    Respiratory: Negative.     Cardiovascular: Negative.    Gastrointestinal: Negative.    Genitourinary: Negative.    Musculoskeletal:  Positive for back pain, joint pain and myalgias.   Skin: Negative.    Neurological:  Positive for weakness.   Endo/Heme/Allergies: Negative.    Psychiatric/Behavioral: Negative.       Review of systems was obtained by the medical assistant and reviewed by myself.    Objective:    /82   Pulse 88   Ht 1.676 m (5' 5.98\")   Wt 84.8 kg (186 lb 15.2 oz)

## 2024-01-22 NOTE — PROGRESS NOTES
Review of Systems   Constitutional: Negative.    HENT: Negative.     Eyes: Negative.    Respiratory: Negative.     Cardiovascular: Negative.    Gastrointestinal: Negative.    Genitourinary: Negative.    Musculoskeletal:  Positive for back pain, joint pain and myalgias.   Skin: Negative.    Neurological:  Positive for weakness.   Endo/Heme/Allergies: Negative.    Psychiatric/Behavioral: Negative.

## 2024-01-23 ENCOUNTER — HOSPITAL ENCOUNTER (OUTPATIENT)
Dept: GENERAL RADIOLOGY | Age: 69
Discharge: HOME | End: 2024-01-23
Attending: NEUROLOGICAL SURGERY
Payer: MEDICARE

## 2024-01-23 PROCEDURE — 72100 X-RAY EXAM L-S SPINE 2/3 VWS: CPT

## 2024-01-25 ENCOUNTER — TELEPHONE (OUTPATIENT)
Dept: NEUROLOGY | Age: 69
End: 2024-01-25

## 2024-01-25 NOTE — TELEPHONE ENCOUNTER
[facility] called asking for the most recent visit note for pt since they received a referral. I printed the visit note from 1/22/24 and faxed to the number given.     [phone]

## 2024-04-22 ENCOUNTER — OFFICE VISIT (OUTPATIENT)
Dept: NEUROSURGERY | Age: 69
End: 2024-04-22
Payer: MEDICARE

## 2024-04-22 ENCOUNTER — HOSPITAL ENCOUNTER (OUTPATIENT)
Dept: GENERAL RADIOLOGY | Age: 69
Discharge: HOME | End: 2024-04-22
Payer: MEDICARE

## 2024-04-22 VITALS
BODY MASS INDEX: 30.05 KG/M2 | HEIGHT: 66 IN | HEART RATE: 77 BPM | SYSTOLIC BLOOD PRESSURE: 110 MMHG | WEIGHT: 186.95 LBS | DIASTOLIC BLOOD PRESSURE: 72 MMHG

## 2024-04-22 DIAGNOSIS — S32.020D COMPRESSION FRACTURE OF L2 LUMBAR VERTEBRA, WITH ROUTINE HEALING, SUBSEQUENT ENCOUNTER: Primary | ICD-10-CM

## 2024-04-22 DIAGNOSIS — S32.020D COMPRESSION FRACTURE OF L2 LUMBAR VERTEBRA, WITH ROUTINE HEALING, SUBSEQUENT ENCOUNTER: ICD-10-CM

## 2024-04-22 PROCEDURE — 3017F COLORECTAL CA SCREEN DOC REV: CPT | Performed by: NEUROLOGICAL SURGERY

## 2024-04-22 PROCEDURE — 1123F ACP DISCUSS/DSCN MKR DOCD: CPT | Performed by: NEUROLOGICAL SURGERY

## 2024-04-22 PROCEDURE — G8427 DOCREV CUR MEDS BY ELIG CLIN: HCPCS | Performed by: NEUROLOGICAL SURGERY

## 2024-04-22 PROCEDURE — G8399 PT W/DXA RESULTS DOCUMENT: HCPCS | Performed by: NEUROLOGICAL SURGERY

## 2024-04-22 PROCEDURE — 99213 OFFICE O/P EST LOW 20 MIN: CPT | Performed by: NEUROLOGICAL SURGERY

## 2024-04-22 PROCEDURE — 1036F TOBACCO NON-USER: CPT | Performed by: NEUROLOGICAL SURGERY

## 2024-04-22 PROCEDURE — 72100 X-RAY EXAM L-S SPINE 2/3 VWS: CPT

## 2024-04-22 PROCEDURE — 1090F PRES/ABSN URINE INCON ASSESS: CPT | Performed by: NEUROLOGICAL SURGERY

## 2024-04-22 PROCEDURE — G8417 CALC BMI ABV UP PARAM F/U: HCPCS | Performed by: NEUROLOGICAL SURGERY

## 2024-04-22 ASSESSMENT — ENCOUNTER SYMPTOMS
RESPIRATORY NEGATIVE: 1
EYES NEGATIVE: 1
GASTROINTESTINAL NEGATIVE: 1
BACK PAIN: 1

## 2024-04-22 NOTE — PROGRESS NOTES
Review of Systems   Constitutional: Negative.    HENT: Negative.     Eyes: Negative.    Respiratory: Negative.     Cardiovascular: Negative.    Gastrointestinal: Negative.    Genitourinary: Negative.    Musculoskeletal:  Positive for back pain, joint pain and myalgias.   Skin: Negative.    Neurological: Negative.    Endo/Heme/Allergies: Negative.    Psychiatric/Behavioral: Negative.        
  Psychiatric/Behavioral: Negative.       Review of systems was obtained by the medical assistant and reviewed by myself.    Objective:    /72   Pulse 77   Ht 1.676 m (5' 5.98\")   Wt 84.8 kg (186 lb 15.2 oz)   BMI 30.19 kg/m²         Physical Exam:    General: alert, cooperative, no distress  Cardiorespiratory: unlabored breathing      Neurologic Exam:    Mental Status: Alert, oriented, thought content appropriate  Cranial Nerves: PERRL, EOMI, symmetric facies, tongue midline  Motor: Motor exam is symmetrical 5 out of 5 all extremities bilaterally  Somatosensory: normal light touch sensation      Imaging:    Updated lumbar x-rays reviewed and compared to prior studies.  There is a persistent L2 compression fracture.  There is no appreciable progression or change in the configuration of the fracture.  No significant progressive height loss or increased kyphosis.  There appears to be a chronic mild compression deformity of L5 that also appears stable.          Assessment and Plan:    68 y.o. F returns for follow-up of an L2 compression fracture sustained after a fall on 11/30/2023.  Her pain has essentially resolved and she has completed physical therapy.  Her x-rays appear stable.  I recommended that she follow-up with her PCP to ensure her bone density is being adequately managed given the appearance of multiple compression fractures on her x-rays.  She may follow up with me on an as-needed basis with future concerns.      Electronically signed by Bryce Harden MD on 4/22/2024 at 9:46 AM